# Patient Record
Sex: FEMALE | Race: OTHER | HISPANIC OR LATINO | URBAN - METROPOLITAN AREA
[De-identification: names, ages, dates, MRNs, and addresses within clinical notes are randomized per-mention and may not be internally consistent; named-entity substitution may affect disease eponyms.]

---

## 2017-08-21 ENCOUNTER — INPATIENT (INPATIENT)
Facility: HOSPITAL | Age: 47
LOS: 3 days | Discharge: HOME | DRG: 224 | End: 2017-08-25
Attending: HOSPITALIST | Admitting: HOSPITALIST
Payer: MEDICAID

## 2017-08-21 VITALS
RESPIRATION RATE: 20 BRPM | HEART RATE: 66 BPM | WEIGHT: 164.02 LBS | TEMPERATURE: 98 F | SYSTOLIC BLOOD PRESSURE: 117 MMHG | DIASTOLIC BLOOD PRESSURE: 78 MMHG | OXYGEN SATURATION: 98 % | HEIGHT: 64 IN

## 2017-08-21 DIAGNOSIS — Z90.13 ACQUIRED ABSENCE OF BILATERAL BREASTS AND NIPPLES: Chronic | ICD-10-CM

## 2017-08-21 DIAGNOSIS — R00.1 BRADYCARDIA, UNSPECIFIED: ICD-10-CM

## 2017-08-21 LAB
ALBUMIN SERPL ELPH-MCNC: 4.1 G/DL — SIGNIFICANT CHANGE UP (ref 3.3–5.2)
ALP SERPL-CCNC: 112 U/L — SIGNIFICANT CHANGE UP (ref 40–120)
ALT FLD-CCNC: 17 U/L — SIGNIFICANT CHANGE UP
ANION GAP SERPL CALC-SCNC: 13 MMOL/L — SIGNIFICANT CHANGE UP (ref 5–17)
APPEARANCE UR: CLEAR — SIGNIFICANT CHANGE UP
AST SERPL-CCNC: 24 U/L — SIGNIFICANT CHANGE UP
BACTERIA # UR AUTO: ABNORMAL
BILIRUB SERPL-MCNC: 0.6 MG/DL — SIGNIFICANT CHANGE UP (ref 0.4–2)
BILIRUB UR-MCNC: NEGATIVE — SIGNIFICANT CHANGE UP
BUN SERPL-MCNC: 16 MG/DL — SIGNIFICANT CHANGE UP (ref 8–20)
CALCIUM SERPL-MCNC: 9.2 MG/DL — SIGNIFICANT CHANGE UP (ref 8.6–10.2)
CHLORIDE SERPL-SCNC: 100 MMOL/L — SIGNIFICANT CHANGE UP (ref 98–107)
CO2 SERPL-SCNC: 24 MMOL/L — SIGNIFICANT CHANGE UP (ref 22–29)
COLOR SPEC: YELLOW — SIGNIFICANT CHANGE UP
COMMENT - URINE: SIGNIFICANT CHANGE UP
CREAT SERPL-MCNC: 0.64 MG/DL — SIGNIFICANT CHANGE UP (ref 0.5–1.3)
DIFF PNL FLD: ABNORMAL
EOSINOPHIL # BLD AUTO: 0.2 K/UL — SIGNIFICANT CHANGE UP (ref 0–0.5)
EOSINOPHIL NFR BLD AUTO: 5.8 % — SIGNIFICANT CHANGE UP (ref 0–6)
EPI CELLS # UR: SIGNIFICANT CHANGE UP
GLUCOSE SERPL-MCNC: 87 MG/DL — SIGNIFICANT CHANGE UP (ref 70–115)
GLUCOSE UR QL: NEGATIVE MG/DL — SIGNIFICANT CHANGE UP
HCT VFR BLD CALC: 40.2 % — SIGNIFICANT CHANGE UP (ref 37–47)
HGB BLD-MCNC: 13.1 G/DL — SIGNIFICANT CHANGE UP (ref 12–16)
KETONES UR-MCNC: NEGATIVE — SIGNIFICANT CHANGE UP
LEUKOCYTE ESTERASE UR-ACNC: ABNORMAL
LYMPHOCYTES # BLD AUTO: 1.6 K/UL — SIGNIFICANT CHANGE UP (ref 1–4.8)
LYMPHOCYTES # BLD AUTO: 54.2 % — SIGNIFICANT CHANGE UP (ref 20–55)
MCHC RBC-ENTMCNC: 29.6 PG — SIGNIFICANT CHANGE UP (ref 27–31)
MCHC RBC-ENTMCNC: 32.6 G/DL — SIGNIFICANT CHANGE UP (ref 32–36)
MCV RBC AUTO: 90.7 FL — SIGNIFICANT CHANGE UP (ref 81–99)
MONOCYTES # BLD AUTO: 0.3 K/UL — SIGNIFICANT CHANGE UP (ref 0–0.8)
MONOCYTES NFR BLD AUTO: 9.5 % — SIGNIFICANT CHANGE UP (ref 3–10)
NEUTROPHILS # BLD AUTO: 0.9 K/UL — LOW (ref 1.8–8)
NEUTROPHILS NFR BLD AUTO: 30.5 % — LOW (ref 37–73)
NITRITE UR-MCNC: NEGATIVE — SIGNIFICANT CHANGE UP
NT-PROBNP SERPL-SCNC: 429 PG/ML — HIGH (ref 0–300)
PH UR: 5 — SIGNIFICANT CHANGE UP (ref 5–8)
PLATELET # BLD AUTO: 217 K/UL — SIGNIFICANT CHANGE UP (ref 150–400)
POTASSIUM SERPL-MCNC: 4.6 MMOL/L — SIGNIFICANT CHANGE UP (ref 3.5–5.3)
POTASSIUM SERPL-SCNC: 4.6 MMOL/L — SIGNIFICANT CHANGE UP (ref 3.5–5.3)
PROT SERPL-MCNC: 7.8 G/DL — SIGNIFICANT CHANGE UP (ref 6.6–8.7)
PROT UR-MCNC: NEGATIVE MG/DL — SIGNIFICANT CHANGE UP
RBC # BLD: 4.43 M/UL — SIGNIFICANT CHANGE UP (ref 4.4–5.2)
RBC # FLD: 11.8 % — SIGNIFICANT CHANGE UP (ref 11–15.6)
RBC CASTS # UR COMP ASSIST: SIGNIFICANT CHANGE UP /HPF (ref 0–4)
SODIUM SERPL-SCNC: 137 MMOL/L — SIGNIFICANT CHANGE UP (ref 135–145)
SP GR SPEC: 1.01 — SIGNIFICANT CHANGE UP (ref 1.01–1.02)
TROPONIN T SERPL-MCNC: <0.01 NG/ML — SIGNIFICANT CHANGE UP (ref 0–0.06)
UROBILINOGEN FLD QL: NEGATIVE MG/DL — SIGNIFICANT CHANGE UP
WBC # BLD: 3 K/UL — LOW (ref 4.8–10.8)
WBC # FLD AUTO: 3 K/UL — LOW (ref 4.8–10.8)
WBC UR QL: SIGNIFICANT CHANGE UP

## 2017-08-21 PROCEDURE — 99285 EMERGENCY DEPT VISIT HI MDM: CPT | Mod: 25

## 2017-08-21 PROCEDURE — 93010 ELECTROCARDIOGRAM REPORT: CPT

## 2017-08-21 PROCEDURE — 71010: CPT | Mod: 26

## 2017-08-21 PROCEDURE — 99223 1ST HOSP IP/OBS HIGH 75: CPT

## 2017-08-21 RX ORDER — ISOSORBIDE MONONITRATE 60 MG/1
30 TABLET, EXTENDED RELEASE ORAL DAILY
Qty: 0 | Refills: 0 | Status: DISCONTINUED | OUTPATIENT
Start: 2017-08-22 | End: 2017-08-24

## 2017-08-21 RX ORDER — SPIRONOLACTONE 25 MG/1
25 TABLET, FILM COATED ORAL DAILY
Qty: 0 | Refills: 0 | Status: DISCONTINUED | OUTPATIENT
Start: 2017-08-22 | End: 2017-08-25

## 2017-08-21 RX ORDER — LOSARTAN POTASSIUM 100 MG/1
25 TABLET, FILM COATED ORAL DAILY
Qty: 0 | Refills: 0 | Status: DISCONTINUED | OUTPATIENT
Start: 2017-08-22 | End: 2017-08-25

## 2017-08-21 RX ORDER — LOSARTAN POTASSIUM 100 MG/1
1 TABLET, FILM COATED ORAL
Qty: 0 | Refills: 0 | COMMUNITY

## 2017-08-21 RX ORDER — AMLODIPINE BESYLATE 2.5 MG/1
20 TABLET ORAL DAILY
Qty: 0 | Refills: 0 | Status: DISCONTINUED | OUTPATIENT
Start: 2017-08-22 | End: 2017-08-24

## 2017-08-21 RX ORDER — METOPROLOL TARTRATE 50 MG
100 TABLET ORAL DAILY
Qty: 0 | Refills: 0 | Status: DISCONTINUED | OUTPATIENT
Start: 2017-08-22 | End: 2017-08-24

## 2017-08-21 RX ORDER — MICONAZOLE NITRATE 2 %
1 CREAM (GRAM) TOPICAL
Qty: 0 | Refills: 0 | Status: DISCONTINUED | OUTPATIENT
Start: 2017-08-21 | End: 2017-08-25

## 2017-08-21 RX ORDER — ENOXAPARIN SODIUM 100 MG/ML
40 INJECTION SUBCUTANEOUS EVERY 24 HOURS
Qty: 0 | Refills: 0 | Status: DISCONTINUED | OUTPATIENT
Start: 2017-08-21 | End: 2017-08-23

## 2017-08-21 RX ORDER — SPIRONOLACTONE 25 MG/1
1 TABLET, FILM COATED ORAL
Qty: 0 | Refills: 0 | COMMUNITY

## 2017-08-21 RX ADMIN — ENOXAPARIN SODIUM 40 MILLIGRAM(S): 100 INJECTION SUBCUTANEOUS at 21:42

## 2017-08-21 NOTE — ED ADULT NURSE NOTE - OBJECTIVE STATEMENT
53 yof [place] speaking presents to ED with complaint of chest tightness 8/10 with palpitations since 6 PM last night. pt states this is a reoccurring problem 53 yof [place] speaking presents to ED with complaint of chest tightness 8/10 with palpitations since 6 PM last night. pt states this is a reoccurring problem. pt states she becomes sob on exertion with  chest tightness. lung sounds clear equal bilaterally, normal s1 s2 heard on auscultation. 53 yof [place] speaking presents to ED with complaint of chest tightness 8/10 with palpitations since 6 PM last night. pt states this is a reoccurring problem. pt states she becomes sob on exertion with  chest tightness. lung sounds clear equal bilaterally, normal s1 s2 heard on auscultation. skin pink warm and dry. sinus bradycardia on monitor, moves all extremities.

## 2017-08-21 NOTE — ED ADULT NURSE REASSESSMENT NOTE - NS ED NURSE REASSESS COMMENT FT1
patient aox4 comfortable in appearance. respirations clear equal and unlabored. heart sounds s1 s2  wdl bradycardia, abdomen soft nontender + bowel sounds all 4 quadrants, moves all extremities. + pulse all 4 extremities. + sensation all 4 extremities. patient and family updated on plan of care. patient and family educated on hourly rounding procedures and understands call bell system.     pt understands NPO at this time. ivf provided for nutrition

## 2017-08-21 NOTE — ED PROVIDER NOTE - CHPI ED SYMPTOMS NEG
no back pain/no cough/no fever/no dizziness/no vomiting/no chills/no diaphoresis/no syncope/no nausea

## 2017-08-21 NOTE — CONSULT NOTE ADULT - ASSESSMENT
47yr old female with h/o NICM  presents with palpitations and generalized fatigue and sob with exertion x 2 weeks.  patient previously seen at [facility] here history is noted an initial presentation of PNA  5/19//2017 at [facility] at that time LVEF 20- 15% low normal Rv systolic function trace MR, cxray revealed a  had pleural effusion s/p  thoracentesis the patient was placed on load reduction therapy  and a Cardiac CT 6/21/2017 revealed nonobstructive CAD. The patient underwent a repeat 2-D echocardiogram on 7/14/2017  which revealed persistent/worsening LV systolic dysfunction LVEF < 15%.  patient presents to day with complaints of worsening JOHNSON x 2 weeks . She denies chest pain syncope pre-syncope orthopnea or LE edema.    A/p  Chronic NICM , NYHA class II HFrEF, (-) cardiac CTA, HTN, QRs 98ms, Aute on chronic HF exacerbation:    HFrEF: Continue load reduction therapy , add lasix 20 mg IV Q12 follow I/os, increase lasix as indicated , daily weights, strict I/os; check TSH  HfrEF: repeat TTEif LVEF <35% will likely benefit from ICD fro primary prevention, once clinically improved  HTN: BP stable near goal  Further reccs to follow

## 2017-08-21 NOTE — H&P ADULT - ASSESSMENT
47yr old female with recently diagnosed cardiomyopathy with EF 15% in june 2017 presents with c/o feeling sob on exertion, pedal swelling intermittently, palpitations and chest tightness. These symptoms have been intermittent, haven gotten worse over last week.   She was admitted in 03/2017 with Sob - treated for pneumonia, had pleural effusion, which was drained. Found to have cardiomyopathy with EF 30%, this was determined to be non-ischemic CMP. Her recent EF in june dropped to 15% inspite of being of CHF meds. She is now being admitted for further care.     1. NICM - telemetry  ECHO  cardio/EP recs  ?ICD palcement  ct CHF meds  check TSH, A1C    2. DVT px     3.Tinea on chest - miconazole ointment

## 2017-08-21 NOTE — ED STATDOCS - PROGRESS NOTE DETAILS
53 year old female, with hx of PNA, "enlarged heart", and "fluid in her heart", presenting to the ED complaining of chest pain x 3 days and exertional SOB x more than 1 week. Pt states her pain is intermittent and feels like "pressure". She states that her pain onset with walking. Pt states she also has intermittent leg swelling. Upon examination in the intake room, pt was found to be non-toxic appearing and in no acute distress. Pt has clears lungs with no acute respiratory distress. Pt also has diminished but regular heart sounds. Pt to be moved into the main ED for further evaluation by another provider.   : Jie  Cardiologist: Dr. Abraham Fernández

## 2017-08-21 NOTE — H&P ADULT - NSHPPHYSICALEXAM_GEN_ALL_CORE
PHYSICAL EXAM:    GENERAL: NAD, well-groomed, well-developed  HEAD:  Atraumatic, Normocephalic  EYES: EOMI, PERRLA, conjunctiva and sclera clear  ENMT: Moist mucous membranes, Good dentition, No lesions  NECK: Supple, No JVD  NERVOUS SYSTEM:  Alert & Oriented X3, Good concentration  CHEST/LUNG: Clear to percussion bilaterally; No rales, rhonchi, wheezing, or rubs  HEART: Regular rate and rhythm; No murmurs, rubs, or gallops  ABDOMEN: Soft, Nontender, Nondistended; Bowel sounds present  EXTREMITIES:   No clubbing, cyanosis, or edema  SKIN: tinea rash on lt side of chest.

## 2017-08-21 NOTE — CONSULT NOTE ADULT - SUBJECTIVE AND OBJECTIVE BOX
Patient is a 47y old  Female who presents with a chief complaint of SOB, palpitations, chest pain (21 Aug 2017 11:12)      HPI:  47yr old female with h/o NICM  presents with palpitations and generalized fatigue and sob with exertion x 2 weeks.  patient previously seen at [facility] here history is noted an initial presentation of PNA  2017 at [facility] at that time LVEF 20- 15% low normal Rv systolic function trace MR, cxray revealed a  had pleural effusion s/p  thoracentesis the patient was placed on load reduction therapy  and a Cardiac CT 2017 revealed nonobstructive CAD. The patient underwent a repeat 2-D echocardiogram on 2017  which revealed persistent/worsening LV systolic dysfunction LVEF < 15%.        PAST MEDICAL & SURGICAL HISTORY:  Essential hypertension  Bradycardia  H/O bilateral breast reduction surgery      PREVIOUS DIAGNOSTIC TESTING:      ECHO  FINDINGS:    STRESS  FINDINGS:    CATHETERIZATION  FINDINGS:      Allergies    No Known Allergies    Intolerances        MEDICATIONS  (STANDING):  enoxaparin Injectable 40 milliGRAM(s) SubCutaneous every 24 hours  miconazole 2% Cream 1 Application(s) Topical two times a day    MEDICATIONS  (PRN):      FAMILY HISTORY:  No pertinent family history in first degree relatives      SOCIAL HISTORY:    CIGARETTES:    ALCOHOL:    REVIEW OF SYSTEMS:  CONSTITUTIONAL: No fever, weight loss, or fatigue  EYES: No eye pain, visual disturbances, or discharge  ENMT:  No difficulty hearing, tinnitus, vertigo; No sinus or throat pain  NECK: No pain or stiffness  RESPIRATORY: No cough, wheezing, chills or hemoptysis; No Shortness of Breath  CARDIOVASCULAR: No chest pain, palpitations, passing out, dizziness, or leg swelling  GASTROINTESTINAL: No abdominal or epigastric pain. No nausea, vomiting, or hematemesis; No diarrhea or constipation. No melena or hematochezia.  GENITOURINARY: No dysuria, frequency, hematuria, or incontinence  NEUROLOGICAL: No headaches, memory loss, loss of strength, numbness, or tremors  SKIN: No itching, burning, rashes, or lesions   LYMPH Nodes: No enlarged glands  ENDOCRINE: No heat or cold intolerance; No hair loss  MUSCULOSKELETAL: No joint pain or swelling; No muscle, back, or extremity pain  PSYCHIATRIC: No depression, anxiety, mood swings, or difficulty sleeping  HEME/LYMPH: No easy bruising, or bleeding gums  ALLERY AND IMMUNOLOGIC: No hives or eczema	    Vital Signs Last 24 Hrs  T(C): 36.6 (21 Aug 2017 11:39), Max: 36.7 (21 Aug 2017 07:06)  T(F): 97.8 (21 Aug 2017 11:39), Max: 98 (21 Aug 2017 07:06)  HR: 52 (21 Aug 2017 11:39) (52 - 66)  BP: 108/67 (21 Aug 2017 11:39) (108/67 - 117/78)  BP(mean): --  RR: 18 (21 Aug 2017 11:39) (18 - 20)  SpO2: 98% (21 Aug 2017 11:39) (98% - 98%)    Daily Height in cm: 162.56 (21 Aug 2017 07:06)    Daily     I&O's Detail      PHYSICAL EXAM:  Appearance: Normal, well nourished	  HEENT:   Normal oral mucosa, PERRL, EOMI, sclera non-icteric	  Lymphatic: No cervical lymphadenopathy  Cardiovascular: Normal S1 S2, No JVD, No cardiac murmurs, No carotid bruits, No peripheral edema  Respiratory: Lungs clear to auscultation	  Psychiatry: A & O x 3, Mood & affect appropriate  Gastrointestinal:  Soft, Non-tender, + BS, no bruits	  Skin: No rashes, No ecchymoses, No cyanosis  Neurologic: Grossly non-focal with full strength in all four extremities  Extremities: Normal range of motion, No clubbing, cyanosis or edema  Vascular: Peripheral pulses palpable 2+ bilaterally      INTERPRETATION OF TELEMETRY:    ECG:    LABS:                        13.1   3.0   )-----------( 217      ( 21 Aug 2017 08:22 )             40.2     08-21    137  |  100  |  16.0  ----------------------------<  87  4.6   |  24.0  |  0.64    Ca    9.2      21 Aug 2017 08:22    TPro  7.8  /  Alb  4.1  /  TBili  0.6  /  DBili  x   /  AST  24  /  ALT  17  /  AlkPhos  112  08-21    CARDIAC MARKERS ( 21 Aug 2017 08:22 )  x     / <0.01 ng/mL / x     / x     / x            Urinalysis Basic - ( 21 Aug 2017 11:40 )    Color: Yellow / Appearance: Clear / S.015 / pH: x  Gluc: x / Ketone: Negative  / Bili: Negative / Urobili: Negative mg/dL   Blood: x / Protein: Negative mg/dL / Nitrite: Negative   Leuk Esterase: Small / RBC: 0-2 /HPF / WBC 3-5   Sq Epi: x / Non Sq Epi: x / Bacteria: x      I&O's Summary    BNPSerum Pro-Brain Natriuretic Peptide: 429 pg/mL ( @ 08:22)    RADIOLOGY & ADDITIONAL STUDIES: Patient is a 47y old  Female who presents with a chief complaint of SOB, palpitations, chest pain (21 Aug 2017 11:12)      HPI:  47yr old female with h/o NICM  presents with palpitations and generalized fatigue and sob with exertion x 2 weeks.  patient previously seen at [facility] here history is noted an initial presentation of PNA  2017 at [facility] at that time LVEF 20- 15% low normal Rv systolic function trace MR, cxray revealed a  had pleural effusion s/p  thoracentesis the patient was placed on load reduction therapy  and a Cardiac CT 2017 revealed nonobstructive CAD. The patient underwent a repeat 2-D echocardiogram on 2017  which revealed persistent/worsening LV systolic dysfunction LVEF < 15%.  patient presents to day with complaints of worsening JOHNSON x 2 weeks . Shge denies chest pain syncope pre-syncope orthopnea or LE edema.        PAST MEDICAL & SURGICAL HISTORY:  Essential hypertension  Bradycardia  H/O bilateral breast reduction surgery      PREVIOUS DIAGNOSTIC TESTING:      ECHO  FINDINGS: [facility] pending  NUMC: 2017" LVEF < 15% low normal RV systolic function , trace MR   NUMC: 2017: LVEF 20-15%  Cardiac CTA: 2017 : Nonobstructive CAD        Allergies    No Known Allergies    Intolerances        MEDICATIONS  (STANDING):  enoxaparin Injectable 40 milliGRAM(s) SubCutaneous every 24 hours  miconazole 2% Cream 1 Application(s) Topical two times a day    MEDICATIONS  (PRN):      FAMILY HISTORY:  No pertinent family history in first degree relatives      SOCIAL HISTORY: Lives with family     CIGARETTES:None    ALCOHOL:None    REVIEW OF SYSTEMS:  CONSTITUTIONAL: No fever, weight loss, or fatigue  EYES: No eye pain, visual disturbances, or discharge  ENMT:  No difficulty hearing, tinnitus, vertigo; No sinus or throat pain  NECK: No pain or stiffness  RESPIRATORY: No cough, wheezing, chills or hemoptysis; No Shortness of Breath  CARDIOVASCULAR: No chest pain, palpitations, passing out, dizziness, or leg swelling  GASTROINTESTINAL: No abdominal or epigastric pain. No nausea, vomiting, or hematemesis; No diarrhea or constipation. No melena or hematochezia.  GENITOURINARY: No dysuria, frequency, hematuria, or incontinence  NEUROLOGICAL: No headaches, memory loss, loss of strength, numbness, or tremors  SKIN: No itching, burning, rashes, or lesions   LYMPH Nodes: No enlarged glands  ENDOCRINE: No heat or cold intolerance; No hair loss  MUSCULOSKELETAL: No joint pain or swelling; No muscle, back, or extremity pain  PSYCHIATRIC: No depression, anxiety, mood swings, or difficulty sleeping  HEME/LYMPH: No easy bruising, or bleeding gums  ALLERY AND IMMUNOLOGIC: No hives or eczema	    Vital Signs Last 24 Hrs  T(C): 36.6 (21 Aug 2017 11:39), Max: 36.7 (21 Aug 2017 07:06)  T(F): 97.8 (21 Aug 2017 11:39), Max: 98 (21 Aug 2017 07:06)  HR: 52 (21 Aug 2017 11:39) (52 - 66)  BP: 108/67 (21 Aug 2017 11:39) (108/67 - 117/78)  BP(mean): --  RR: 18 (21 Aug 2017 11:39) (18 - 20)  SpO2: 98% (21 Aug 2017 11:39) (98% - 98%)    Daily Height in cm: 162.56 (21 Aug 2017 07:06)    Daily     I&O's Detail      PHYSICAL EXAM:  Appearance: Normal, well nourished	  HEENT:   Normal oral mucosa, PERRL, EOMI, sclera non-icteric	  Lymphatic: No cervical lymphadenopathy  Cardiovascular: Normal S1 S2, No JVD, No cardiac murmurs, No carotid bruits, No peripheral edema  Respiratory: Lungs clear to auscultation	  Psychiatry: A & O x 3, Mood & affect appropriate  Gastrointestinal:  Soft, Non-tender, + BS, no bruits	  Skin: No rashes, No ecchymoses, No cyanosis  Neurologic: Grossly non-focal with full strength in all four extremities  Extremities: Normal range of motion, No clubbing, cyanosis or edema  Vascular: Peripheral pulses palpable 2+ bilaterally      INTERPRETATION OF TELEMETRY:    ECG:    LABS:                        13.1   3.0   )-----------( 217      ( 21 Aug 2017 08:22 )             40.2     08-21    137  |  100  |  16.0  ----------------------------<  87  4.6   |  24.0  |  0.64    Ca    9.2      21 Aug 2017 08:22    TPro  7.8  /  Alb  4.1  /  TBili  0.6  /  DBili  x   /  AST  24  /  ALT  17  /  AlkPhos  112  08-21    CARDIAC MARKERS ( 21 Aug 2017 08:22 )  x     / <0.01 ng/mL / x     / x     / x            Urinalysis Basic - ( 21 Aug 2017 11:40 )    Color: Yellow / Appearance: Clear / S.015 / pH: x  Gluc: x / Ketone: Negative  / Bili: Negative / Urobili: Negative mg/dL   Blood: x / Protein: Negative mg/dL / Nitrite: Negative   Leuk Esterase: Small / RBC: 0-2 /HPF / WBC 3-5   Sq Epi: x / Non Sq Epi: x / Bacteria: x      I&O's Summary    BNPSerum Pro-Brain Natriuretic Peptide: 429 pg/mL ( @ 08:22)    RADIOLOGY & ADDITIONAL STUDIES:

## 2017-08-21 NOTE — H&P ADULT - HISTORY OF PRESENT ILLNESS
47yr old fany with recently diagnosed cardiomyopathy with EF 15% in june 2017 presents with c/o feeling sob on exertion, pedal swelling intermittently, palpitations and chest tightness. These symptoms have been intermittent, haven gotten worse over last week.   She was admitted in 03/2017 with Sob - treated for pneumonia, had pleural effusion, which was drained. Found to have cardiomyopahty with EF 30%, this was determined to be non-schemic CMP. Her reent EF in june dropped to 15% inspite of being of CHF meds. She is now being admitted for further care.

## 2017-08-22 LAB
ANION GAP SERPL CALC-SCNC: 13 MMOL/L — SIGNIFICANT CHANGE UP (ref 5–17)
BUN SERPL-MCNC: 22 MG/DL — HIGH (ref 8–20)
CALCIUM SERPL-MCNC: 8.9 MG/DL — SIGNIFICANT CHANGE UP (ref 8.6–10.2)
CHLORIDE SERPL-SCNC: 101 MMOL/L — SIGNIFICANT CHANGE UP (ref 98–107)
CO2 SERPL-SCNC: 26 MMOL/L — SIGNIFICANT CHANGE UP (ref 22–29)
CREAT SERPL-MCNC: 0.8 MG/DL — SIGNIFICANT CHANGE UP (ref 0.5–1.3)
GLUCOSE SERPL-MCNC: 97 MG/DL — SIGNIFICANT CHANGE UP (ref 70–115)
HBA1C BLD-MCNC: 5.2 % — SIGNIFICANT CHANGE UP (ref 4–5.6)
HCT VFR BLD CALC: 39.8 % — SIGNIFICANT CHANGE UP (ref 37–47)
HGB BLD-MCNC: 13 G/DL — SIGNIFICANT CHANGE UP (ref 12–16)
MAGNESIUM SERPL-MCNC: 2.2 MG/DL — SIGNIFICANT CHANGE UP (ref 1.6–2.6)
MCHC RBC-ENTMCNC: 29.6 PG — SIGNIFICANT CHANGE UP (ref 27–31)
MCHC RBC-ENTMCNC: 32.7 G/DL — SIGNIFICANT CHANGE UP (ref 32–36)
MCV RBC AUTO: 90.7 FL — SIGNIFICANT CHANGE UP (ref 81–99)
PLATELET # BLD AUTO: 226 K/UL — SIGNIFICANT CHANGE UP (ref 150–400)
POTASSIUM SERPL-MCNC: 4.1 MMOL/L — SIGNIFICANT CHANGE UP (ref 3.5–5.3)
POTASSIUM SERPL-SCNC: 4.1 MMOL/L — SIGNIFICANT CHANGE UP (ref 3.5–5.3)
RBC # BLD: 4.39 M/UL — LOW (ref 4.4–5.2)
RBC # FLD: 11.8 % — SIGNIFICANT CHANGE UP (ref 11–15.6)
SODIUM SERPL-SCNC: 140 MMOL/L — SIGNIFICANT CHANGE UP (ref 135–145)
TSH SERPL-MCNC: 0.36 UIU/ML — SIGNIFICANT CHANGE UP (ref 0.27–4.2)
WBC # BLD: 3.2 K/UL — LOW (ref 4.8–10.8)
WBC # FLD AUTO: 3.2 K/UL — LOW (ref 4.8–10.8)

## 2017-08-22 PROCEDURE — 99232 SBSQ HOSP IP/OBS MODERATE 35: CPT

## 2017-08-22 RX ORDER — ACETAMINOPHEN 500 MG
650 TABLET ORAL EVERY 6 HOURS
Qty: 0 | Refills: 0 | Status: DISCONTINUED | OUTPATIENT
Start: 2017-08-22 | End: 2017-08-25

## 2017-08-22 RX ADMIN — ISOSORBIDE MONONITRATE 30 MILLIGRAM(S): 60 TABLET, EXTENDED RELEASE ORAL at 12:04

## 2017-08-22 RX ADMIN — Medication 1 APPLICATION(S): at 09:43

## 2017-08-22 RX ADMIN — LOSARTAN POTASSIUM 25 MILLIGRAM(S): 100 TABLET, FILM COATED ORAL at 09:42

## 2017-08-22 RX ADMIN — Medication 100 MILLIGRAM(S): at 15:23

## 2017-08-22 RX ADMIN — Medication 1 APPLICATION(S): at 18:40

## 2017-08-22 RX ADMIN — AMLODIPINE BESYLATE 20 MILLIGRAM(S): 2.5 TABLET ORAL at 09:42

## 2017-08-22 RX ADMIN — SPIRONOLACTONE 25 MILLIGRAM(S): 25 TABLET, FILM COATED ORAL at 09:42

## 2017-08-22 RX ADMIN — ENOXAPARIN SODIUM 40 MILLIGRAM(S): 100 INJECTION SUBCUTANEOUS at 21:41

## 2017-08-22 NOTE — PROGRESS NOTE ADULT - SUBJECTIVE AND OBJECTIVE BOX
AAKASH MAHONEY    [id number]    47y      Female    CC: admitted with symptomatic chronic systolic HF 2/2 NICM with  SOB & palpitations   c/o headache   SOB and palpitations are ok since she has been in bed    INTERVAL HPI/OVERNIGHT EVENTS: no acute events    REVIEW OF SYSTEMS:    CONSTITUTIONAL: No fever  RESPIRATORY: No cough No shortness of breath  CARDIOVASCULAR: No chest pain, palpitations      Vital Signs Last 24 Hrs  T(C): 36.7 (22 Aug 2017 09:40), Max: 36.9 (21 Aug 2017 17:42)  T(F): 98 (22 Aug 2017 09:40), Max: 98.5 (21 Aug 2017 17:42)  HR: 55 (22 Aug 2017 09:40) (50 - 61)  BP: 140/74 (22 Aug 2017 09:40) (92/60 - 140/74)  BP(mean): --  RR: 16 (22 Aug 2017 09:40) (15 - 18)  SpO2: 94% (22 Aug 2017 09:40) (94% - 99%)    PHYSICAL EXAM:    GENERAL: NAD, well-groomed  HEENT: PERRL, +EOMI  NECK: soft, Supple  CHEST/LUNG: Clear to percussion bilaterally; No wheezing  HEART: S1S2+, Regular rate and rhythm; No murmurs, rubs, or gallops  EXTREMITIES: No clubbing, cyanosis, or edema  SKIN: tinea on Lt chest  NEURO: AAOX3      08- @ 07:01  -   @ 07:00  --------------------------------------------------------  IN: 120 mL / OUT: 150 mL / NET: -30 mL        LABS:                        13.0   3.2   )-----------( 226      ( 22 Aug 2017 05:20 )             39.8     08    140  |  101  |  22.0<H>  ----------------------------<  97  4.1   |  26.0  |  0.80    Ca    8.9      22 Aug 2017 05:20  Mg     2.2         TPro  7.8  /  Alb  4.1  /  TBili  0.6  /  DBili  x   /  AST  24  /  ALT  17  /  AlkPhos  112  08-21      Urinalysis Basic - ( 21 Aug 2017 11:40 )    Color: Yellow / Appearance: Clear / S.015 / pH: x  Gluc: x / Ketone: Negative  / Bili: Negative / Urobili: Negative mg/dL   Blood: x / Protein: Negative mg/dL / Nitrite: Negative   Leuk Esterase: Small / RBC: 0-2 /HPF / WBC 3-5   Sq Epi: x / Non Sq Epi: x / Bacteria: x          MEDICATIONS  : reviewed       RADIOLOGY & ADDITIONAL TESTS:

## 2017-08-23 DIAGNOSIS — Z29.9 ENCOUNTER FOR PROPHYLACTIC MEASURES, UNSPECIFIED: ICD-10-CM

## 2017-08-23 DIAGNOSIS — I42.9 CARDIOMYOPATHY, UNSPECIFIED: ICD-10-CM

## 2017-08-23 DIAGNOSIS — B35.9 DERMATOPHYTOSIS, UNSPECIFIED: ICD-10-CM

## 2017-08-23 DIAGNOSIS — I10 ESSENTIAL (PRIMARY) HYPERTENSION: ICD-10-CM

## 2017-08-23 LAB
ANION GAP SERPL CALC-SCNC: 14 MMOL/L — SIGNIFICANT CHANGE UP (ref 5–17)
BLD GP AB SCN SERPL QL: SIGNIFICANT CHANGE UP
BUN SERPL-MCNC: 22 MG/DL — HIGH (ref 8–20)
CALCIUM SERPL-MCNC: 9.1 MG/DL — SIGNIFICANT CHANGE UP (ref 8.6–10.2)
CHLORIDE SERPL-SCNC: 100 MMOL/L — SIGNIFICANT CHANGE UP (ref 98–107)
CO2 SERPL-SCNC: 23 MMOL/L — SIGNIFICANT CHANGE UP (ref 22–29)
CREAT SERPL-MCNC: 0.8 MG/DL — SIGNIFICANT CHANGE UP (ref 0.5–1.3)
GLUCOSE SERPL-MCNC: 97 MG/DL — SIGNIFICANT CHANGE UP (ref 70–115)
HCT VFR BLD CALC: 40 % — SIGNIFICANT CHANGE UP (ref 37–47)
HGB BLD-MCNC: 13.5 G/DL — SIGNIFICANT CHANGE UP (ref 12–16)
MAGNESIUM SERPL-MCNC: 2.2 MG/DL — SIGNIFICANT CHANGE UP (ref 1.6–2.6)
MCHC RBC-ENTMCNC: 30 PG — SIGNIFICANT CHANGE UP (ref 27–31)
MCHC RBC-ENTMCNC: 33.8 G/DL — SIGNIFICANT CHANGE UP (ref 32–36)
MCV RBC AUTO: 88.9 FL — SIGNIFICANT CHANGE UP (ref 81–99)
PLATELET # BLD AUTO: 220 K/UL — SIGNIFICANT CHANGE UP (ref 150–400)
POTASSIUM SERPL-MCNC: 4.4 MMOL/L — SIGNIFICANT CHANGE UP (ref 3.5–5.3)
POTASSIUM SERPL-SCNC: 4.4 MMOL/L — SIGNIFICANT CHANGE UP (ref 3.5–5.3)
RBC # BLD: 4.5 M/UL — SIGNIFICANT CHANGE UP (ref 4.4–5.2)
RBC # FLD: 11.9 % — SIGNIFICANT CHANGE UP (ref 11–15.6)
SODIUM SERPL-SCNC: 137 MMOL/L — SIGNIFICANT CHANGE UP (ref 135–145)
TYPE + AB SCN PNL BLD: SIGNIFICANT CHANGE UP
WBC # BLD: 2.9 K/UL — LOW (ref 4.8–10.8)
WBC # FLD AUTO: 2.9 K/UL — LOW (ref 4.8–10.8)

## 2017-08-23 PROCEDURE — 99233 SBSQ HOSP IP/OBS HIGH 50: CPT

## 2017-08-23 PROCEDURE — 93306 TTE W/DOPPLER COMPLETE: CPT | Mod: 26

## 2017-08-23 RX ORDER — FUROSEMIDE 40 MG
20 TABLET ORAL EVERY 12 HOURS
Qty: 0 | Refills: 0 | Status: DISCONTINUED | OUTPATIENT
Start: 2017-08-23 | End: 2017-08-24

## 2017-08-23 RX ADMIN — ISOSORBIDE MONONITRATE 30 MILLIGRAM(S): 60 TABLET, EXTENDED RELEASE ORAL at 12:12

## 2017-08-23 RX ADMIN — ENOXAPARIN SODIUM 40 MILLIGRAM(S): 100 INJECTION SUBCUTANEOUS at 21:15

## 2017-08-23 RX ADMIN — LOSARTAN POTASSIUM 25 MILLIGRAM(S): 100 TABLET, FILM COATED ORAL at 10:43

## 2017-08-23 RX ADMIN — Medication 20 MILLIGRAM(S): at 09:10

## 2017-08-23 RX ADMIN — Medication 100 MILLIGRAM(S): at 09:10

## 2017-08-23 RX ADMIN — AMLODIPINE BESYLATE 20 MILLIGRAM(S): 2.5 TABLET ORAL at 10:43

## 2017-08-23 RX ADMIN — SPIRONOLACTONE 25 MILLIGRAM(S): 25 TABLET, FILM COATED ORAL at 09:10

## 2017-08-23 RX ADMIN — Medication 1 APPLICATION(S): at 17:21

## 2017-08-23 RX ADMIN — Medication 20 MILLIGRAM(S): at 17:18

## 2017-08-23 NOTE — PROGRESS NOTE ADULT - SUBJECTIVE AND OBJECTIVE BOX
Patient is a 47y old  Female who presents with a chief complaint of SOB, palpitations, chest pain (21 Aug 2017 11:12)        INTERVAL HPI/OVERNIGHT EVENTS:  Patient seen and examined at bedside. No acute events overnight. Patient states she has JOHNSON and fatigue which persists. Has no known pmhx and is from [place].  Patient denies chest pain, abd pain, N/V, fever, chills, dysuria or any other complaints. All remainder ROS negative.         Vital Signs Last 24 Hrs  T(C): 37 (23 Aug 2017 16:08), Max: 37 (23 Aug 2017 16:08)  T(F): 98.6 (23 Aug 2017 16:08), Max: 98.6 (23 Aug 2017 16:08)  HR: 78 (23 Aug 2017 16:08) (53 - 84)  BP: 101/66 (23 Aug 2017 16:08) (96/50 - 112/64)  BP(mean): --  RR: 16 (23 Aug 2017 16:08) (15 - 16)  SpO2: 98% (23 Aug 2017 12:10) (97% - 98%)      I&O's Summary    22 Aug 2017 07:01  -  23 Aug 2017 07:00  --------------------------------------------------------  IN: 240 mL / OUT: 2150 mL / NET: -1910 mL    23 Aug 2017 07:01  -  23 Aug 2017 16:58  --------------------------------------------------------  IN: 0 mL / OUT: 1450 mL / NET: -1450 mL        CONSTITUTIONAL: Well appearing, well nourished, awake, alert and in no apparent distress  CARDIAC: Normal rate, regular rhythm.  Heart sounds S1, S2.  No murmurs, rubs or gallops   RESPIRATORY: Decreased air entry, bibasilar crackles   GASTROENTEROLOGY: Soft, NT ND BS+ normoactive   EXTREMITIES: No edema, cyanosis or deformity   NEUROLOGICAL: Alert and oriented, no focal deficits, no motor or sensory deficits.  SKIN: No rash, skin turgor wnl     MEDICATIONS  (STANDING):  enoxaparin Injectable 40 milliGRAM(s) SubCutaneous every 24 hours  isosorbide   mononitrate ER Tablet (IMDUR) 30 milliGRAM(s) Oral daily  losartan 25 milliGRAM(s) Oral daily  metoprolol succinate  milliGRAM(s) Oral daily  spironolactone 25 milliGRAM(s) Oral daily  amLODIPine   Tablet 20 milliGRAM(s) Oral daily  miconazole 2% Cream 1 Application(s) Topical two times a day  furosemide   Injectable 20 milliGRAM(s) IV Push every 12 hours    MEDICATIONS  (PRN):  acetaminophen   Tablet. 650 milliGRAM(s) Oral every 6 hours PRN Mild Pain (1 - 3)      LABS:                        13.5   2.9   )-----------( 220      ( 23 Aug 2017 05:19 )             40.0     08-23    137  |  100  |  22.0<H>  ----------------------------<  97  4.4   |  23.0  |  0.80    Ca    9.1      23 Aug 2017 05:19  Mg     2.2     08-23        RADIOLOGY & ADDITIONAL TESTS:  Pending TTE

## 2017-08-23 NOTE — PROGRESS NOTE ADULT - PROBLEM SELECTOR PLAN 1
NICM HFrEF <15%   -pt with symptoms of JOHNSON/ fatigue   -Continue load reduction therapy  -added lasix 20 mg IV Q12   -c/w imdur 20mg po qd   -c/w losartan 25mg po qd   -c/w toprol xl 100mg po qd   -c/w aldactone 25mg po qd   -c/w daily weight/strict I/Os    -tsh wnl   -repeat TTE and if LVEF <35% will likely benefit from ICD for primary prevention as per EP   -EP f/u noted and appreciated

## 2017-08-23 NOTE — PROGRESS NOTE ADULT - SUBJECTIVE AND OBJECTIVE BOX
Events noted , stable on tele SB no sxs  Echo pending   MEDICATIONS  (STANDING):  enoxaparin Injectable 40 milliGRAM(s) SubCutaneous every 24 hours  isosorbide   mononitrate ER Tablet (IMDUR) 30 milliGRAM(s) Oral daily  losartan 25 milliGRAM(s) Oral daily  metoprolol succinate  milliGRAM(s) Oral daily  spironolactone 25 milliGRAM(s) Oral daily  amLODIPine   Tablet 20 milliGRAM(s) Oral daily  miconazole 2% Cream 1 Application(s) Topical two times a day  furosemide   Injectable 20 milliGRAM(s) IV Push every 12 hours    MEDICATIONS  (PRN):  acetaminophen   Tablet. 650 milliGRAM(s) Oral every 6 hours PRN Mild Pain (1 - 3)      Allergies    No Known Allergies    Intolerances      PAST MEDICAL & SURGICAL HISTORY:  Essential hypertension  Bradycardia  H/O bilateral breast reduction surgery      Vital Signs Last 24 Hrs  T(C): 36.8 (23 Aug 2017 09:01), Max: 36.8 (22 Aug 2017 15:52)  T(F): 98.2 (23 Aug 2017 09:01), Max: 98.3 (22 Aug 2017 21:30)  HR: 68 (23 Aug 2017 09:01) (53 - 100)  BP: 112/64 (23 Aug 2017 09:01) (96/50 - 124/88)  BP(mean): --  RR: 16 (23 Aug 2017 09:01) (15 - 16)  SpO2: 98% (23 Aug 2017 09:01) (97% - 98%)    Physical Exam:  Constitutional: NAD, AAOx3  Cardiovascular: +S1S2 RRR  Pulmonary: CTA b/l, unlabored  Abd: soft NTND +BS  Groins: C/D/I bilaterally; no bleeding, hematoma, edema  Extremities: no pedal edema, +distal pulses b/l  Neuro: non focal, REEVES x4    LABS:                        13.5   2.9   )-----------( 220      ( 23 Aug 2017 05:19 )             40.0     08-23    137  |  100  |  22.0<H>  ----------------------------<  97  4.4   |  23.0  |  0.80    Ca    9.1      23 Aug 2017 05:19  Mg     2.2     08-23        Urinalysis Basic - ( 21 Aug 2017 11:40 )    Color: Yellow / Appearance: Clear / S.015 / pH: x  Gluc: x / Ketone: Negative  / Bili: Negative / Urobili: Negative mg/dL   Blood: x / Protein: Negative mg/dL / Nitrite: Negative   Leuk Esterase: Small / RBC: 0-2 /HPF / WBC 3-5   Sq Epi: x / Non Sq Epi: x / Bacteria: x

## 2017-08-23 NOTE — DIETITIAN INITIAL EVALUATION ADULT. - OTHER INFO
Pt reports good po intake at meals.  Pt with slightly decreased po intake pta- [place] and does not like her sister's cooking.  Pt states that she tries to eat healthy.  Meal plan reviewed and literature provided in [place].

## 2017-08-24 LAB
ANION GAP SERPL CALC-SCNC: 16 MMOL/L — SIGNIFICANT CHANGE UP (ref 5–17)
BUN SERPL-MCNC: 33 MG/DL — HIGH (ref 8–20)
CALCIUM SERPL-MCNC: 9.6 MG/DL — SIGNIFICANT CHANGE UP (ref 8.6–10.2)
CHLORIDE SERPL-SCNC: 98 MMOL/L — SIGNIFICANT CHANGE UP (ref 98–107)
CO2 SERPL-SCNC: 21 MMOL/L — LOW (ref 22–29)
CREAT SERPL-MCNC: 0.75 MG/DL — SIGNIFICANT CHANGE UP (ref 0.5–1.3)
GLUCOSE SERPL-MCNC: 94 MG/DL — SIGNIFICANT CHANGE UP (ref 70–115)
HCT VFR BLD CALC: 42 % — SIGNIFICANT CHANGE UP (ref 37–47)
HGB BLD-MCNC: 14.1 G/DL — SIGNIFICANT CHANGE UP (ref 12–16)
INR BLD: 1.04 RATIO — SIGNIFICANT CHANGE UP (ref 0.88–1.16)
MAGNESIUM SERPL-MCNC: 2.2 MG/DL — SIGNIFICANT CHANGE UP (ref 1.6–2.6)
MCHC RBC-ENTMCNC: 30.1 PG — SIGNIFICANT CHANGE UP (ref 27–31)
MCHC RBC-ENTMCNC: 33.6 G/DL — SIGNIFICANT CHANGE UP (ref 32–36)
MCV RBC AUTO: 89.6 FL — SIGNIFICANT CHANGE UP (ref 81–99)
PLATELET # BLD AUTO: 256 K/UL — SIGNIFICANT CHANGE UP (ref 150–400)
POTASSIUM SERPL-MCNC: 4.2 MMOL/L — SIGNIFICANT CHANGE UP (ref 3.5–5.3)
POTASSIUM SERPL-SCNC: 4.2 MMOL/L — SIGNIFICANT CHANGE UP (ref 3.5–5.3)
PROTHROM AB SERPL-ACNC: 11.4 SEC — SIGNIFICANT CHANGE UP (ref 9.8–12.7)
RBC # BLD: 4.69 M/UL — SIGNIFICANT CHANGE UP (ref 4.4–5.2)
RBC # FLD: 11.8 % — SIGNIFICANT CHANGE UP (ref 11–15.6)
SODIUM SERPL-SCNC: 135 MMOL/L — SIGNIFICANT CHANGE UP (ref 135–145)
WBC # BLD: 3.4 K/UL — LOW (ref 4.8–10.8)
WBC # FLD AUTO: 3.4 K/UL — LOW (ref 4.8–10.8)

## 2017-08-24 PROCEDURE — 33249 INSJ/RPLCMT DEFIB W/LEAD(S): CPT | Mod: 59

## 2017-08-24 PROCEDURE — 71010: CPT | Mod: 26

## 2017-08-24 PROCEDURE — 93010 ELECTROCARDIOGRAM REPORT: CPT

## 2017-08-24 PROCEDURE — 99233 SBSQ HOSP IP/OBS HIGH 50: CPT

## 2017-08-24 RX ORDER — ACETAMINOPHEN 500 MG
650 TABLET ORAL EVERY 6 HOURS
Qty: 0 | Refills: 0 | Status: DISCONTINUED | OUTPATIENT
Start: 2017-08-24 | End: 2017-08-25

## 2017-08-24 RX ORDER — ONDANSETRON 8 MG/1
4 TABLET, FILM COATED ORAL EVERY 6 HOURS
Qty: 0 | Refills: 0 | Status: DISCONTINUED | OUTPATIENT
Start: 2017-08-24 | End: 2017-08-25

## 2017-08-24 RX ORDER — METOPROLOL TARTRATE 50 MG
50 TABLET ORAL DAILY
Qty: 0 | Refills: 0 | Status: DISCONTINUED | OUTPATIENT
Start: 2017-08-25 | End: 2017-08-25

## 2017-08-24 RX ORDER — HYDROMORPHONE HYDROCHLORIDE 2 MG/ML
1 INJECTION INTRAMUSCULAR; INTRAVENOUS; SUBCUTANEOUS ONCE
Qty: 0 | Refills: 0 | Status: DISCONTINUED | OUTPATIENT
Start: 2017-08-24 | End: 2017-08-24

## 2017-08-24 RX ORDER — DIPHENHYDRAMINE HCL 50 MG
25 CAPSULE ORAL ONCE
Qty: 0 | Refills: 0 | Status: COMPLETED | OUTPATIENT
Start: 2017-08-24 | End: 2017-08-24

## 2017-08-24 RX ORDER — ALPRAZOLAM 0.25 MG
0.25 TABLET ORAL
Qty: 0 | Refills: 0 | Status: DISCONTINUED | OUTPATIENT
Start: 2017-08-24 | End: 2017-08-25

## 2017-08-24 RX ORDER — OXYCODONE AND ACETAMINOPHEN 5; 325 MG/1; MG/1
1 TABLET ORAL EVERY 6 HOURS
Qty: 0 | Refills: 0 | Status: DISCONTINUED | OUTPATIENT
Start: 2017-08-24 | End: 2017-08-25

## 2017-08-24 RX ORDER — CEFAZOLIN SODIUM 1 G
2000 VIAL (EA) INJECTION
Qty: 0 | Refills: 0 | Status: COMPLETED | OUTPATIENT
Start: 2017-08-24 | End: 2017-08-25

## 2017-08-24 RX ORDER — FUROSEMIDE 40 MG
20 TABLET ORAL DAILY
Qty: 0 | Refills: 0 | Status: DISCONTINUED | OUTPATIENT
Start: 2017-08-25 | End: 2017-08-25

## 2017-08-24 RX ADMIN — Medication 25 MILLIGRAM(S): at 23:27

## 2017-08-24 RX ADMIN — AMLODIPINE BESYLATE 20 MILLIGRAM(S): 2.5 TABLET ORAL at 05:58

## 2017-08-24 RX ADMIN — OXYCODONE AND ACETAMINOPHEN 1 TABLET(S): 5; 325 TABLET ORAL at 16:16

## 2017-08-24 RX ADMIN — Medication 650 MILLIGRAM(S): at 20:13

## 2017-08-24 RX ADMIN — Medication 650 MILLIGRAM(S): at 21:32

## 2017-08-24 RX ADMIN — SPIRONOLACTONE 25 MILLIGRAM(S): 25 TABLET, FILM COATED ORAL at 05:58

## 2017-08-24 RX ADMIN — HYDROMORPHONE HYDROCHLORIDE 1 MILLIGRAM(S): 2 INJECTION INTRAMUSCULAR; INTRAVENOUS; SUBCUTANEOUS at 23:22

## 2017-08-24 RX ADMIN — LOSARTAN POTASSIUM 25 MILLIGRAM(S): 100 TABLET, FILM COATED ORAL at 05:58

## 2017-08-24 RX ADMIN — Medication 100 MILLIGRAM(S): at 05:58

## 2017-08-24 RX ADMIN — HYDROMORPHONE HYDROCHLORIDE 1 MILLIGRAM(S): 2 INJECTION INTRAMUSCULAR; INTRAVENOUS; SUBCUTANEOUS at 21:35

## 2017-08-24 RX ADMIN — Medication 100 MILLIGRAM(S): at 20:15

## 2017-08-24 NOTE — DISCHARGE NOTE ADULT - HOSPITAL COURSE
47 year old female with recently diagnosed cardiomyopathy- NICM;  previously seen at [facility] 5/19/2017 at that time LVEF 15-20% low normal Rv systolic function trace MR, x ray revealed a pleural effusion s/p thoracentesis and load reduction therapy and a Cardiac CT 6/21/2017 which revealed nonobstructive CAD. The patient underwent a repeat 2-D echocardiogram on 7/14/2017  which revealed persistent/worsening LV systolic dysfunction LVEF < 15%. Currently pt presented with complaints of worsening JOHNSON x 2 weeks. Pt admitted with acute on chronic systolic HF exacerbation, on IV diuresis which was switched to po and optimization with medical therapy. EP consulted and repeat TTE showed persistent LV dysfunction <20%. S/p ICD placement on 8/24/17. Patient's medications were adjusted and norvasc and imdur were discontinued due to hypotension. Lasix and aldactone were added tot he patients regimen. Patient to f/u with EP Dr. Juárez in 2 weeks for device check and further medication optimization at [facility] HF clinic.     Discharge planning took 45 minutes 47 year old female with recently diagnosed cardiomyopathy- NICM;  previously seen at [facility] 5/19/2017 at that time LVEF 15-20% low normal Rv systolic function trace MR, x ray revealed a pleural effusion s/p thoracentesis and load reduction therapy and a Cardiac CT 6/21/2017 which revealed nonobstructive CAD. The patient underwent a repeat 2-D echocardiogram on 7/14/2017  which revealed persistent/worsening LV systolic dysfunction LVEF < 15%. Currently pt presented with complaints of worsening JOHNSON x 2 weeks. Pt admitted with acute on chronic systolic HF exacerbation, on IV diuresis which was switched to po and optimization with medical therapy. EP consulted and repeat TTE showed persistent LV dysfunction <20%. S/p ICD placement on 8/24/17. Patient's medications were adjusted and Norvasc and Imdur were discontinued due to hypotension. Lasix and aldactone were added tot he patients regimen. Patient to f/u with EP Dr. Juárez in 2 weeks for device check and further medication optimization at [facility] HF clinic.     PHYSICAL EXAM:    Vital Signs Last 24 Hrs  T(C): 36.8 (25 Aug 2017 07:35), Max: 37.1 (24 Aug 2017 21:42)  T(F): 98.3 (25 Aug 2017 07:35), Max: 98.8 (24 Aug 2017 21:42)  HR: 68 (25 Aug 2017 07:35) (60 - 74)  BP: 106/68 (25 Aug 2017 07:35) (94/54 - 112/70)  BP(mean): --  RR: 18 (25 Aug 2017 07:35) (18 - 22)  SpO2: 96% (25 Aug 2017 05:22) (96% - 98%)    CONSTITUTIONAL: Well appearing, well nourished, awake, alert and in no apparent distress  CARDIAC: Normal rate, regular rhythm.  Heart sounds S1, S2.  No murmurs, rubs or gallops   RESPIRATORY: Decreased air entry, bibasilar crackles   GASTROENTEROLOGY: Soft, NT ND BS+ normoactive   EXTREMITIES: No edema, cyanosis or deformity   NEUROLOGICAL: Alert and oriented, no focal deficits, no motor or sensory deficits.  SKIN: No rash, skin turgor wnl  left chest ICD w/ dressing C/D/I         Discharge planning took 45 minutes 47 year old female with recently diagnosed cardiomyopathy- NICM;  previously seen at [facility] 5/19/2017 at that time LVEF 15-20% low normal Rv systolic function trace MR, x ray revealed a pleural effusion s/p thoracentesis and load reduction therapy and a Cardiac CT 6/21/2017 which revealed nonobstructive CAD. The patient underwent a repeat 2-D echocardiogram on 7/14/2017  which revealed persistent/worsening LV systolic dysfunction LVEF < 15%. Currently pt presented with complaints of worsening JOHNSON x 2 weeks. Pt admitted with acute on chronic systolic HF exacerbation, on IV diuresis which was switched to po and optimization with medical therapy. EP consulted and repeat TTE showed persistent LV dysfunction <20%. S/p ICD placement on 8/24/17. Patient's medications were adjusted and Norvasc and Imdur were discontinued due to hypotension (Ok with cardiology). Lasix and aldactone were added tot he patients regimen. Patient to f/u with EP Dr. Juárez next week for device check and further medication optimization at [facility] HF clinic.     PHYSICAL EXAM:    Vital Signs Last 24 Hrs  T(C): 36.8 (25 Aug 2017 07:35), Max: 37.1 (24 Aug 2017 21:42)  T(F): 98.3 (25 Aug 2017 07:35), Max: 98.8 (24 Aug 2017 21:42)  HR: 68 (25 Aug 2017 07:35) (60 - 74)  BP: 106/68 (25 Aug 2017 07:35) (94/54 - 112/70)  BP(mean): --  RR: 18 (25 Aug 2017 07:35) (18 - 22)  SpO2: 96% (25 Aug 2017 05:22) (96% - 98%)    CONSTITUTIONAL: Well appearing, well nourished, awake, alert and in no apparent distress  CARDIAC: Normal rate, regular rhythm.  Heart sounds S1, S2.  No murmurs, rubs or gallops   RESPIRATORY: Decreased air entry, bibasilar crackles   GASTROENTEROLOGY: Soft, NT ND BS+ normoactive   EXTREMITIES: No edema, cyanosis or deformity   NEUROLOGICAL: Alert and oriented, no focal deficits, no motor or sensory deficits.  SKIN: No rash, skin turgor wnl  left chest ICD w/ dressing C/D/I         Discharge planning took 45 minutes

## 2017-08-24 NOTE — DISCHARGE NOTE ADULT - CARE PLAN
Principal Discharge DX:	Cardiomyopathy  Goal:	s/p ICD implant  Instructions for follow-up, activity and diet:	- Bruising around the implant site or over the chest, side or arm near the incision is normal, and will take a few weeks to resolve.  - Leave the sutures in place. They will be removed at your follow up appointment.   -Do not lift the affected arm higher than 90 degrees (shoulder height) in any direction for 6 weeks.   - Do not push, pull or lift anything heavier than 10 lbs (about a gallon of milk) with the affected arm for 4 weeks.     - Do not touch the incision until it is completely healed.   - Do not apply soaps, creams, lotions, ointments or powders to the incision until it is completely healed.  You should call the doctor if:   - you notice redness, drainage, swelling, increased tenderness, hot sensation around the  incision, bleeding or incision edges pulling apart.  - your temperature is greater than 100 degress F for more than 24 hours.  - you notice swelling or bulging at the incision or around the device that was not there when you left the hospital or is increasing in size.  -you experience increased difficulty breathing.  - you notice new/worsening swelling in your legs and ankles.  - you faint or have dizzy spells.  -you have any questions or concerns regarding your device or the procedure. Principal Discharge DX:	Cardiomyopathy  Goal:	s/p ICD implant  Instructions for follow-up, activity and diet:	- Bruising around the implant site or over the chest, side or arm near the incision is normal, and will take a few weeks to resolve.  - Leave the sutures in place. They will be removed at your follow up appointment.   -Do not lift the affected arm higher than 90 degrees (shoulder height) in any direction for 6 weeks.   - Do not push, pull or lift anything heavier than 10 lbs (about a gallon of milk) with the affected arm for 4 weeks.     - Do not touch the incision until it is completely healed.   - Do not apply soaps, creams, lotions, ointments or powders to the incision until it is completely healed.  You should call the doctor if:   - you notice redness, drainage, swelling, increased tenderness, hot sensation around the  incision, bleeding or incision edges pulling apart.  - your temperature is greater than 100 degress F for more than 24 hours.  - you notice swelling or bulging at the incision or around the device that was not there when you left the hospital or is increasing in size.  -you experience increased difficulty breathing.  - you notice new/worsening swelling in your legs and ankles.  - you faint or have dizzy spells.  -you have any questions or concerns regarding your device or the procedure.  Please follow up with Dr. Juárez as directed.  Secondary Diagnosis:	Essential hypertension  Instructions for follow-up, activity and diet:	Continue with medications as prescribed. Principal Discharge DX:	Cardiomyopathy  Goal:	s/p ICD implant  Instructions for follow-up, activity and diet:	- Bruising around the implant site or over the chest, side or arm near the incision is normal, and will take a few weeks to resolve.  - Leave the sutures in place. They will be removed at your follow up appointment.   -Do not lift the affected arm higher than 90 degrees (shoulder height) in any direction for 6 weeks.   - Do not push, pull or lift anything heavier than 10 lbs (about a gallon of milk) with the affected arm for 4 weeks.     - Do not touch the incision until it is completely healed.   - Do not apply soaps, creams, lotions, ointments or powders to the incision until it is completely healed.  You should call the doctor if:   - you notice redness, drainage, swelling, increased tenderness, hot sensation around the  incision, bleeding or incision edges pulling apart.  - your temperature is greater than 100 degress F for more than 24 hours.  - you notice swelling or bulging at the incision or around the device that was not there when you left the hospital or is increasing in size.  -you experience increased difficulty breathing.  - you notice new/worsening swelling in your legs and ankles.  - you faint or have dizzy spells.  -you have any questions or concerns regarding your device or the procedure.  Please follow up with Dr. Juárez as directed.  Secondary Diagnosis:	Essential hypertension  Instructions for follow-up, activity and diet:	Continue with medications as prescribed.  Secondary Diagnosis:	Tinea  Instructions for follow-up, activity and diet:	Continue with miconazole ointment and finish course, if worsens or persists consider dermatologist evaluation. Principal Discharge DX:	Cardiomyopathy  Goal:	s/p ICD implant  Instructions for follow-up, activity and diet:	- Bruising around the implant site or over the chest, side or arm near the incision is normal, and will take a few weeks to resolve.  - Leave the sutures in place. They will be removed at your follow up appointment.   -Do not lift the affected arm higher than 90 degrees (shoulder height) in any direction for 6 weeks.   - Do not push, pull or lift anything heavier than 10 lbs (about a gallon of milk) with the affected arm for 4 weeks.     - Do not touch the incision until it is completely healed.   - Do not apply soaps, creams, lotions, ointments or powders to the incision until it is completely healed.  You should call the doctor if:   - you notice redness, drainage, swelling, increased tenderness, hot sensation around the  incision, bleeding or incision edges pulling apart.  - your temperature is greater than 100 degrees F for more than 24 hours.  - you notice swelling or bulging at the incision or around the device that was not there when you left the hospital or is increasing in size.  -you experience increased difficulty breathing.  - you notice new/worsening swelling in your legs and ankles.  - you faint or have dizzy spells.  -you have any questions or concerns regarding your device or the procedure.  Please follow up with Dr. Juárez as directed.  Secondary Diagnosis:	Essential hypertension  Instructions for follow-up, activity and diet:	Continue with medications as prescribed.  Secondary Diagnosis:	Tinea  Instructions for follow-up, activity and diet:	Continue with miconazole ointment and finish course, if worsens or persists consider dermatologist evaluation. Principal Discharge DX:	Cardiomyopathy  Goal:	s/p ICD implant  Instructions for follow-up, activity and diet:	- Bruising around the implant site or over the chest, side or arm near the incision is normal, and will take a few weeks to resolve.  - Leave the sutures in place. They will be removed at your follow up appointment.   -Do not lift the affected arm higher than 90 degrees (shoulder height) in any direction for 6 weeks.   - Do not push, pull or lift anything heavier than 10 lbs (about a gallon of milk) with the affected arm for 4 weeks.     - Do not touch the incision until it is completely healed.   - Do not apply soaps, creams, lotions, ointments or powders to the incision until it is completely healed.  You should call the doctor if:   - you notice redness, drainage, swelling, increased tenderness, hot sensation around the  incision, bleeding or incision edges pulling apart.  - your temperature is greater than 100 degrees F for more than 24 hours.  - you notice swelling or bulging at the incision or around the device that was not there when you left the hospital or is increasing in size.  -you experience increased difficulty breathing.  - you notice new/worsening swelling in your legs and ankles.  - you faint or have dizzy spells.  -you have any questions or concerns regarding your device or the procedure.  Please follow up with Dr. Juárez as directed.  Secondary Diagnosis:	Essential hypertension  Instructions for follow-up, activity and diet:	Continue with medications as prescribed. Make sure to follow with cardiology as mentioned and also with PCP in DR.  Secondary Diagnosis:	Tinea  Instructions for follow-up, activity and diet:	Continue with miconazole ointment and finish course, if worsens or persists consider dermatologist evaluation.

## 2017-08-24 NOTE — DISCHARGE NOTE ADULT - MEDICATION SUMMARY - MEDICATIONS TO TAKE
I will START or STAY ON the medications listed below when I get home from the hospital:    spironolactone 25 mg oral tablet  -- 1 tab(s) by mouth once a day  -- Indication: For Cardiomyopathy    Actamin 325 mg oral tablet  -- 2 tab(s) by mouth every 6 hours, As needed, Mild Pain (1 - 3) MDD:As needed for pain.  -- Indication: For Pain    losartan 25 mg oral tablet  -- 1 tab(s) by mouth once a day  -- Indication: For Cardiomyopathy    Mondoxyne  mg oral capsule  -- 1 cap(s) by mouth every 12 hours  -- Indication: For INfection    metoprolol succinate 50 mg oral tablet, extended release  -- 1 tab(s) by mouth once a day  -- Indication: For Cardiomyopathy    Lasix 20 mg oral tablet  -- 1 tab(s) by mouth once a day  -- Indication: For Cardiomyopathy

## 2017-08-24 NOTE — DISCHARGE NOTE ADULT - CARE PROVIDER_API CALL
Jamel Juárez (MD; MPH), Cardiac Electrophysiology; Cardiovascular Disease; Internal Medicine  [address] [place] [id number]  Phone: [phone]  Fax: [phone]

## 2017-08-24 NOTE — PROGRESS NOTE ADULT - PROBLEM SELECTOR PLAN 1
NICM HFrEF <20%   -pt with symptoms of JOHNSON/ fatigue   -Continue load reduction therapy  -lasix changed from IV to lasix 20mg po qd    -c/w losartan 25mg po qd   -decreased toprol xl 100mg po qd to 50mg po qd due to episodes of hypotension  -c/w aldactone 25mg po qd   -discontinued imdur 30mg po qd for now and if bp tolerates can add at a later date   -c/w daily weight/strict I/Os    -tsh wnl   -repeat TTE showed LVEF <20%   -EP f/u noted and appreciated and pt s/p single chamber primary prevention -BSCI ICD (VVI 50bpm).  -As per EP pt to c/w  doxycycline 100mg po bid x 5 days   -D/w Dr. Juárez and EP NP Nidia and pt to have site check in the am, EKG, repeat labs and if cleared by EP will d/c to f/u as an outpt in 2 weeks with Dr Juárez at [facility] clinic for device check and suture removal. NICM HFrEF <20% on medical therapy with persistent LV dysfunction   -pt with symptoms of JOHNSON/ fatigue   -Continue load reduction therapy  -lasix changed from IV to lasix 20mg po qd    -c/w losartan 25mg po qd   -decreased toprol xl 100mg po qd to 50mg po qd due to episodes of hypotension  -c/w aldactone 25mg po qd   -discontinued imdur 30mg po qd for now and if bp tolerates can add at a later date   -c/w daily weight/strict I/Os    -tsh wnl   -repeat TTE showed LVEF <20%   -EP f/u noted and appreciated and pt s/p single chamber primary prevention -BSCI ICD (VVI 50bpm).  -As per EP pt to c/w  doxycycline 100mg po bid x 5 days   -D/w Dr. Juárez and EP NP Nidia and pt to have site check in the am, EKG, repeat labs and if cleared by EP will d/c to f/u as an outpt in 2 weeks with Dr Juárez at [facility] clinic for device check and suture removal.

## 2017-08-24 NOTE — PROGRESS NOTE ADULT - SUBJECTIVE AND OBJECTIVE BOX
Pt s/p single chamber BSCI ICD to LACW.      ECG:  CXR prelim:      MEDICATIONS  (STANDING):  isosorbide   mononitrate ER Tablet (IMDUR) 30 milliGRAM(s) Oral daily  losartan 25 milliGRAM(s) Oral daily  metoprolol succinate  milliGRAM(s) Oral daily  spironolactone 25 milliGRAM(s) Oral daily  amLODIPine   Tablet 20 milliGRAM(s) Oral daily  miconazole 2% Cream 1 Application(s) Topical two times a day  furosemide   Injectable 20 milliGRAM(s) IV Push every 12 hours      PAST MEDICAL & SURGICAL HISTORY:  Essential hypertension  Bradycardia  H/O bilateral breast reduction surgery      Vital Signs Last 24 Hrs  HR: 68   BP: 103/67   RR: 18   SpO2: 98%     Physical Exam:  Constitutional: NAD, AAOx3  Cardiovascular: +S1S2 RRR  Pulmonary: CTA b/l, unlabored  GI: soft NTND +BS  Extremities: no pedal edema, +distal pulses b/l  Neuro: non focal, REEVES x4    LABS:                        14.1   3.4   )-----------( 256      ( 24 Aug 2017 05:33 )             42.0     08-24    135  |  98  |  33.0<H>  ----------------------------<  94  4.2   |  21.0<L>  |  0.75    Ca    9.6      24 Aug 2017 05:33  Mg     2.2     08-24      PT/INR - ( 24 Aug 2017 05:33 )   PT: 11.4 sec;   INR: 1.04 ratio      A/P: 48 yo female with NICM EF <20% on optimal meds >3months s/p single chamber BSCI ICD.    -continue tele monitoring  -AM ECG, LABS, CXR PA/LAT, BSCI interrogation  -ancef 2 grams IV Q8hrs x 2 more doses, next at 9pm  -no lifting left arm over shoulder Pt s/p single chamber BSCI ICD to LACW (VVI 50bpm).    ECG: NSR, prolonged AV delay, LVH, poor R wave progression  CXR prelim: no PTX, good lead placement. cardiomegaly      MEDICATIONS  (STANDING):  isosorbide   mononitrate ER Tablet (IMDUR) 30 milliGRAM(s) Oral daily  losartan 25 milliGRAM(s) Oral daily  metoprolol succinate  milliGRAM(s) Oral daily  spironolactone 25 milliGRAM(s) Oral daily  amLODIPine   Tablet 20 milliGRAM(s) Oral daily  miconazole 2% Cream 1 Application(s) Topical two times a day  furosemide   Injectable 20 milliGRAM(s) IV Push every 12 hours      PAST MEDICAL & SURGICAL HISTORY:  Essential hypertension  Bradycardia  H/O bilateral breast reduction surgery      Vital Signs Last 24 Hrs  HR: 71  BP: 96/66  RR: 18   SpO2: 98%     Physical Exam:  Constitutional: NAD, awake  Cardiovascular: +S1S2 RRR  LACW device site:+ pressure dressing c/d/i without bleeding, swelling, hematoma +2radial pulse  Pulmonary: CTA b/l, unlabored  GI: soft NTND +BS  Extremities: no pedal edema, +distal pulses b/l  Neuro: non focal, REEVES x4    LABS:                        14.1   3.4   )-----------( 256      ( 24 Aug 2017 05:33 )             42.0     08-24    135  |  98  |  33.0<H>  ----------------------------<  94  4.2   |  21.0<L>  |  0.75    Ca    9.6      24 Aug 2017 05:33  Mg     2.2     08-24      PT/INR - ( 24 Aug 2017 05:33 )   PT: 11.4 sec;   INR: 1.04 ratio      A/P: 48 yo female with NICM EF <20% on optimal meds >3months s/p single chamber primary prevention  BSCI ICD (VVI 50bpm).    -continue tele monitoring  -AM ECG, LABS, CXR PA/LAT, BSCI interrogation  -ancef 2 grams IV Q8hrs x 2 more doses, next at 9pm, then doxycycline 100mg po bid x 5 days  -no lifting left arm over shoulder x 6 weeks, keep site dry x 1 week  -outpt follow up 2 weeks Dr Juárez at [facility] clinic for device check and suture removal. Follow up [facility] CHF clinic 2-4 weeks.  -continue metoprolol, losartan, spironolactone, imdur  above dw Dr Juárez agrees Pt s/p single chamber BSCI ICD to LACW (VVI 50bpm).    ECG: NSR, prolonged AV delay, LVH, poor R wave progression  CXR prelim: no PTX, good lead placement. cardiomegaly      MEDICATIONS  (STANDING):  isosorbide   mononitrate ER Tablet (IMDUR) 30 milliGRAM(s) Oral daily  losartan 25 milliGRAM(s) Oral daily  metoprolol succinate  milliGRAM(s) Oral daily  spironolactone 25 milliGRAM(s) Oral daily  amLODIPine   Tablet 20 milliGRAM(s) Oral daily  miconazole 2% Cream 1 Application(s) Topical two times a day  furosemide   Injectable 20 milliGRAM(s) IV Push every 12 hours      PAST MEDICAL & SURGICAL HISTORY:  Essential hypertension  Bradycardia  H/O bilateral breast reduction surgery      Vital Signs Last 24 Hrs  HR: 71  BP: 96/66  RR: 18   SpO2: 98%     Physical Exam:  Constitutional: NAD, awake  Cardiovascular: +S1S2 RRR  LACW device site:+ pressure dressing c/d/i without bleeding, swelling, hematoma +2radial pulse  Pulmonary: CTA b/l, unlabored  GI: soft NTND +BS  Extremities: no pedal edema, +distal pulses b/l  Neuro: non focal, REEVES x4    LABS:                        14.1   3.4   )-----------( 256      ( 24 Aug 2017 05:33 )             42.0     08-24    135  |  98  |  33.0<H>  ----------------------------<  94  4.2   |  21.0<L>  |  0.75    Ca    9.6      24 Aug 2017 05:33  Mg     2.2     08-24      PT/INR - ( 24 Aug 2017 05:33 )   PT: 11.4 sec;   INR: 1.04 ratio      A/P: 48 yo female with NICM EF <20% on optimal meds >3months s/p single chamber primary prevention  BSCI ICD (VVI 50bpm).    -continue tele monitoring  -AM ECG, LABS, CXR PA/LAT, BSCI interrogation  -ancef 2 grams IV Q8hrs x 2 more doses, next at 9pm, then doxycycline 100mg po bid x 5 days  -no lifting left arm over shoulder x 6 weeks, keep site dry x 1 week  -outpt follow up 2 weeks Dr Juárez at [facility] clinic for device check and suture removal. Follow up [facility] CHF clinic 2-4 weeks.  -continue metoprolol, losartan, spironolactone, imdur  -device care, restrictions, follow up and shock plan reviewed with pt via  [employer]  -s/w case management Aleksandra to speak with pt regarding [place] and returning to [place]  above dw Dr Juárez agrees

## 2017-08-24 NOTE — DISCHARGE NOTE ADULT - ADDITIONAL INSTRUCTIONS
Please schedule a 2 week post ICD follow for device check and suture removal with Dr Juárez at [facility] [phone] Please schedule a 2 week post ICD follow for device check and suture removal with Dr Juárez at [facility] [phone]. At that time further medical optimization of your medications will be completed. Please follow up with your primary care physician when you return home to [place]. Please post ICD follow up next week for device check and suture removal with Dr Juárez at [facility] [phone]. At that time further medical optimization of your medications will be completed. Please follow up with your primary care physician when you return home to [place].

## 2017-08-24 NOTE — DISCHARGE NOTE ADULT - PLAN OF CARE
s/p ICD implant - Bruising around the implant site or over the chest, side or arm near the incision is normal, and will take a few weeks to resolve.  - Leave the sutures in place. They will be removed at your follow up appointment.   -Do not lift the affected arm higher than 90 degrees (shoulder height) in any direction for 6 weeks.   - Do not push, pull or lift anything heavier than 10 lbs (about a gallon of milk) with the affected arm for 4 weeks.     - Do not touch the incision until it is completely healed.   - Do not apply soaps, creams, lotions, ointments or powders to the incision until it is completely healed.  You should call the doctor if:   - you notice redness, drainage, swelling, increased tenderness, hot sensation around the  incision, bleeding or incision edges pulling apart.  - your temperature is greater than 100 degress F for more than 24 hours.  - you notice swelling or bulging at the incision or around the device that was not there when you left the hospital or is increasing in size.  -you experience increased difficulty breathing.  - you notice new/worsening swelling in your legs and ankles.  - you faint or have dizzy spells.  -you have any questions or concerns regarding your device or the procedure. Continue with medications as prescribed. - Bruising around the implant site or over the chest, side or arm near the incision is normal, and will take a few weeks to resolve.  - Leave the sutures in place. They will be removed at your follow up appointment.   -Do not lift the affected arm higher than 90 degrees (shoulder height) in any direction for 6 weeks.   - Do not push, pull or lift anything heavier than 10 lbs (about a gallon of milk) with the affected arm for 4 weeks.     - Do not touch the incision until it is completely healed.   - Do not apply soaps, creams, lotions, ointments or powders to the incision until it is completely healed.  You should call the doctor if:   - you notice redness, drainage, swelling, increased tenderness, hot sensation around the  incision, bleeding or incision edges pulling apart.  - your temperature is greater than 100 degress F for more than 24 hours.  - you notice swelling or bulging at the incision or around the device that was not there when you left the hospital or is increasing in size.  -you experience increased difficulty breathing.  - you notice new/worsening swelling in your legs and ankles.  - you faint or have dizzy spells.  -you have any questions or concerns regarding your device or the procedure.  Please follow up with Dr. Juárez as directed. Continue with miconazole ointment and finish course, if worsens or persists consider dermatologist evaluation. - Bruising around the implant site or over the chest, side or arm near the incision is normal, and will take a few weeks to resolve.  - Leave the sutures in place. They will be removed at your follow up appointment.   -Do not lift the affected arm higher than 90 degrees (shoulder height) in any direction for 6 weeks.   - Do not push, pull or lift anything heavier than 10 lbs (about a gallon of milk) with the affected arm for 4 weeks.     - Do not touch the incision until it is completely healed.   - Do not apply soaps, creams, lotions, ointments or powders to the incision until it is completely healed.  You should call the doctor if:   - you notice redness, drainage, swelling, increased tenderness, hot sensation around the  incision, bleeding or incision edges pulling apart.  - your temperature is greater than 100 degrees F for more than 24 hours.  - you notice swelling or bulging at the incision or around the device that was not there when you left the hospital or is increasing in size.  -you experience increased difficulty breathing.  - you notice new/worsening swelling in your legs and ankles.  - you faint or have dizzy spells.  -you have any questions or concerns regarding your device or the procedure.  Please follow up with Dr. Juárez as directed. Continue with medications as prescribed. Make sure to follow with cardiology as mentioned and also with PCP in

## 2017-08-24 NOTE — DISCHARGE NOTE ADULT - MEDICATION SUMMARY - MEDICATIONS TO STOP TAKING
I will STOP taking the medications listed below when I get home from the hospital:    amLODIPine 10 mg oral tablet  -- 2 tab(s) by mouth once a day    isosorbide mononitrate 30 mg oral tablet, extended release  -- 1 tab(s) by mouth once a day (in the morning)

## 2017-08-24 NOTE — PROGRESS NOTE ADULT - PROBLEM SELECTOR PLAN 3
Pt with episodes of hypotension  -d/c norvasc/imdur and will c/w metoprolol at decreased dose as stated above, lasix/ aldactone
-bp stable   -c/w norvasc 20mg po qd

## 2017-08-24 NOTE — PROGRESS NOTE ADULT - SUBJECTIVE AND OBJECTIVE BOX
Patient is a 47y old  Female who presents with a chief complaint of SOB, palpitations, chest pain (24 Aug 2017 13:19)      HEALTH ISSUES - PROBLEM Dx:  Prophylactic measure: Prophylactic measure  Essential hypertension: Essential hypertension  Tinea: Tinea  Cardiomyopathy: Cardiomyopathy        INTERVAL HPI/OVERNIGHT EVENTS:  Patient seen and examined at bedside. No acute events overnight. S/P ICD placement Patient states she is feeling well, pain at incision site, otherwise no complaints. Patient denies chest pain, SOB, abd pain, N/V, fever, chills, dysuria or any other complaints. All remainder ROS negative.     Vital Signs Last 24 Hrs  T(C): 36.8 (24 Aug 2017 17:29), Max: 36.8 (24 Aug 2017 17:29)  T(F): 98.2 (24 Aug 2017 17:29), Max: 98.2 (24 Aug 2017 17:29)  HR: 60 (24 Aug 2017 19:58) (60 - 74)  BP: 94/54 (24 Aug 2017 17:29) (94/54 - 103/67)  BP(mean): --  RR: 20 (24 Aug 2017 17:29) (10 - 26)  SpO2: 97% (24 Aug 2017 17:29) (96% - 100%)        CONSTITUTIONAL: Well appearing, well nourished, awake, alert and in no apparent distress  CARDIAC: Normal rate, regular rhythm.  Heart sounds S1, S2.  No murmurs, rubs or gallops   RESPIRATORY: Decreased air entry, bibasilar crackles   GASTROENTEROLOGY: Soft, NT ND BS+ normoactive   EXTREMITIES: No edema, cyanosis or deformity   NEUROLOGICAL: Alert and oriented, no focal deficits, no motor or sensory deficits.  SKIN: No rash, skin turgor wnl  left chest ICD w/ dressing C/D/I     I&O's Summary    23 Aug 2017 07:01  -  24 Aug 2017 07:00  --------------------------------------------------------  IN: 0 mL / OUT: 1450 mL / NET: -1450 mL    24 Aug 2017 07:01  -  24 Aug 2017 20:19  --------------------------------------------------------  IN: 240 mL / OUT: 400 mL / NET: -160 mL        CONSTITUTIONAL: Well appearing, well nourished, awake, alert and in no apparent distress  ENMT: Airway patent, Nasal mucosa clear. Mouth with normal mucosa. Throat has no vesicles, no oropharyngeal exudates and uvula is midline.  EYES: Clear bilaterally, pupils equal, round and reactive to light. EOMI.  CARDIAC: Normal rate, regular rhythm.  Heart sounds S1, S2.  No murmurs, rubs or gallops   RESPIRATORY: Breath sounds clear and equal bilaterally. No wheezes, rhales or rhonchi  MUSCULOSKELETAL: Spine appears normal, range of motion is not limited, no muscle or joint tenderness  EXTREMITIES: No edema, cyanosis or deformity   NEUROLOGICAL: Alert and oriented, no focal deficits, no motor or sensory deficits.  SKIN: No rash, skin turgor     MEDICATIONS  (STANDING):  isosorbide   mononitrate ER Tablet (IMDUR) 30 milliGRAM(s) Oral daily  losartan 25 milliGRAM(s) Oral daily  spironolactone 25 milliGRAM(s) Oral daily  miconazole 2% Cream 1 Application(s) Topical two times a day  ceFAZolin   IVPB 2000 milliGRAM(s) IV Intermittent <User Schedule>    MEDICATIONS  (PRN):  acetaminophen   Tablet. 650 milliGRAM(s) Oral every 6 hours PRN Mild Pain (1 - 3)  acetaminophen   Tablet. 650 milliGRAM(s) Oral every 6 hours PRN Mild Pain (1 - 3)  oxyCODONE    5 mG/acetaminophen 325 mG 1 Tablet(s) Oral every 6 hours PRN Moderate Pain (4 - 6)  ALPRAZolam 0.25 milliGRAM(s) Oral four times a day PRN anxiety or insomnia  ondansetron Injectable 4 milliGRAM(s) IV Push every 6 hours PRN Nausea and/or Vomiting      LABS:                        14.1   3.4   )-----------( 256      ( 24 Aug 2017 05:33 )             42.0     08-24    135  |  98  |  33.0<H>  ----------------------------<  94  4.2   |  21.0<L>  |  0.75    Ca    9.6      24 Aug 2017 05:33  Mg     2.2     08-24      PT/INR - ( 24 Aug 2017 05:33 )   PT: 11.4 sec;   INR: 1.04 ratio           RADIOLOGY & ADDITIONAL TESTS:  No new imaging studies

## 2017-08-24 NOTE — PROGRESS NOTE ADULT - ATTENDING COMMENTS
Dispo: D/c home in the am after cleared by EP for disposition. SW consulted b/c pt is having issues with her Visa and needs further clarification and guidance in regards to this matter.

## 2017-08-24 NOTE — DISCHARGE NOTE ADULT - PATIENT PORTAL LINK FT
“You can access the Follow[employer] Patient Portal, offered by [facility], by registering with the following website: [email or web address]”

## 2017-08-25 VITALS — SYSTOLIC BLOOD PRESSURE: 118 MMHG | DIASTOLIC BLOOD PRESSURE: 83 MMHG | TEMPERATURE: 99 F

## 2017-08-25 LAB
ABO RH CONFIRMATION: SIGNIFICANT CHANGE UP
ANION GAP SERPL CALC-SCNC: 16 MMOL/L — SIGNIFICANT CHANGE UP (ref 5–17)
BASOPHILS # BLD AUTO: 0 K/UL — SIGNIFICANT CHANGE UP (ref 0–0.2)
BASOPHILS NFR BLD AUTO: 0.2 % — SIGNIFICANT CHANGE UP (ref 0–2)
BUN SERPL-MCNC: 34 MG/DL — HIGH (ref 8–20)
CALCIUM SERPL-MCNC: 9.3 MG/DL — SIGNIFICANT CHANGE UP (ref 8.6–10.2)
CHLORIDE SERPL-SCNC: 97 MMOL/L — LOW (ref 98–107)
CO2 SERPL-SCNC: 22 MMOL/L — SIGNIFICANT CHANGE UP (ref 22–29)
CREAT SERPL-MCNC: 0.79 MG/DL — SIGNIFICANT CHANGE UP (ref 0.5–1.3)
EOSINOPHIL # BLD AUTO: 0.1 K/UL — SIGNIFICANT CHANGE UP (ref 0–0.5)
EOSINOPHIL NFR BLD AUTO: 1.9 % — SIGNIFICANT CHANGE UP (ref 0–6)
GLUCOSE SERPL-MCNC: 99 MG/DL — SIGNIFICANT CHANGE UP (ref 70–115)
HCT VFR BLD CALC: 38.4 % — SIGNIFICANT CHANGE UP (ref 37–47)
HGB BLD-MCNC: 13.1 G/DL — SIGNIFICANT CHANGE UP (ref 12–16)
LYMPHOCYTES # BLD AUTO: 1.4 K/UL — SIGNIFICANT CHANGE UP (ref 1–4.8)
LYMPHOCYTES # BLD AUTO: 28.9 % — SIGNIFICANT CHANGE UP (ref 20–55)
MAGNESIUM SERPL-MCNC: 2.2 MG/DL — SIGNIFICANT CHANGE UP (ref 1.6–2.6)
MCHC RBC-ENTMCNC: 30.2 PG — SIGNIFICANT CHANGE UP (ref 27–31)
MCHC RBC-ENTMCNC: 34.1 G/DL — SIGNIFICANT CHANGE UP (ref 32–36)
MCV RBC AUTO: 88.5 FL — SIGNIFICANT CHANGE UP (ref 81–99)
MONOCYTES # BLD AUTO: 0.3 K/UL — SIGNIFICANT CHANGE UP (ref 0–0.8)
MONOCYTES NFR BLD AUTO: 6.7 % — SIGNIFICANT CHANGE UP (ref 3–10)
NEUTROPHILS # BLD AUTO: 3 K/UL — SIGNIFICANT CHANGE UP (ref 1.8–8)
NEUTROPHILS NFR BLD AUTO: 62.3 % — SIGNIFICANT CHANGE UP (ref 37–73)
PLATELET # BLD AUTO: 211 K/UL — SIGNIFICANT CHANGE UP (ref 150–400)
POTASSIUM SERPL-MCNC: 4.2 MMOL/L — SIGNIFICANT CHANGE UP (ref 3.5–5.3)
POTASSIUM SERPL-SCNC: 4.2 MMOL/L — SIGNIFICANT CHANGE UP (ref 3.5–5.3)
RBC # BLD: 4.34 M/UL — LOW (ref 4.4–5.2)
RBC # FLD: 11.8 % — SIGNIFICANT CHANGE UP (ref 11–15.6)
SODIUM SERPL-SCNC: 135 MMOL/L — SIGNIFICANT CHANGE UP (ref 135–145)
WBC # BLD: 4.8 K/UL — SIGNIFICANT CHANGE UP (ref 4.8–10.8)
WBC # FLD AUTO: 4.8 K/UL — SIGNIFICANT CHANGE UP (ref 4.8–10.8)

## 2017-08-25 PROCEDURE — 93005 ELECTROCARDIOGRAM TRACING: CPT

## 2017-08-25 PROCEDURE — 86901 BLOOD TYPING SEROLOGIC RH(D): CPT

## 2017-08-25 PROCEDURE — 83735 ASSAY OF MAGNESIUM: CPT

## 2017-08-25 PROCEDURE — 99239 HOSP IP/OBS DSCHRG MGMT >30: CPT

## 2017-08-25 PROCEDURE — 84484 ASSAY OF TROPONIN QUANT: CPT

## 2017-08-25 PROCEDURE — 83880 ASSAY OF NATRIURETIC PEPTIDE: CPT

## 2017-08-25 PROCEDURE — 71045 X-RAY EXAM CHEST 1 VIEW: CPT

## 2017-08-25 PROCEDURE — 99285 EMERGENCY DEPT VISIT HI MDM: CPT | Mod: 25

## 2017-08-25 PROCEDURE — 33249 INSJ/RPLCMT DEFIB W/LEAD(S): CPT

## 2017-08-25 PROCEDURE — 85027 COMPLETE CBC AUTOMATED: CPT

## 2017-08-25 PROCEDURE — 36415 COLL VENOUS BLD VENIPUNCTURE: CPT

## 2017-08-25 PROCEDURE — 80048 BASIC METABOLIC PNL TOTAL CA: CPT

## 2017-08-25 PROCEDURE — 71046 X-RAY EXAM CHEST 2 VIEWS: CPT

## 2017-08-25 PROCEDURE — 83036 HEMOGLOBIN GLYCOSYLATED A1C: CPT

## 2017-08-25 PROCEDURE — C1722: CPT

## 2017-08-25 PROCEDURE — 71020: CPT | Mod: 26

## 2017-08-25 PROCEDURE — 86850 RBC ANTIBODY SCREEN: CPT

## 2017-08-25 PROCEDURE — 93010 ELECTROCARDIOGRAM REPORT: CPT

## 2017-08-25 PROCEDURE — 85610 PROTHROMBIN TIME: CPT

## 2017-08-25 PROCEDURE — 93306 TTE W/DOPPLER COMPLETE: CPT

## 2017-08-25 PROCEDURE — T1013: CPT

## 2017-08-25 PROCEDURE — C1777: CPT

## 2017-08-25 PROCEDURE — C1769: CPT

## 2017-08-25 PROCEDURE — 86900 BLOOD TYPING SEROLOGIC ABO: CPT

## 2017-08-25 PROCEDURE — 81001 URINALYSIS AUTO W/SCOPE: CPT

## 2017-08-25 PROCEDURE — 84443 ASSAY THYROID STIM HORMONE: CPT

## 2017-08-25 PROCEDURE — 80053 COMPREHEN METABOLIC PANEL: CPT

## 2017-08-25 RX ORDER — ACETAMINOPHEN 500 MG
2 TABLET ORAL
Qty: 30 | Refills: 0 | OUTPATIENT
Start: 2017-08-25 | End: 2017-09-04

## 2017-08-25 RX ORDER — ISOSORBIDE MONONITRATE 60 MG/1
1 TABLET, EXTENDED RELEASE ORAL
Qty: 0 | Refills: 0 | COMMUNITY

## 2017-08-25 RX ORDER — AMLODIPINE BESYLATE 2.5 MG/1
2 TABLET ORAL
Qty: 0 | Refills: 0 | COMMUNITY

## 2017-08-25 RX ORDER — METOPROLOL TARTRATE 50 MG
1 TABLET ORAL
Qty: 30 | Refills: 0 | OUTPATIENT
Start: 2017-08-25 | End: 2017-09-24

## 2017-08-25 RX ORDER — FUROSEMIDE 40 MG
1 TABLET ORAL
Qty: 30 | Refills: 0 | OUTPATIENT
Start: 2017-08-25 | End: 2017-09-24

## 2017-08-25 RX ORDER — METOPROLOL TARTRATE 50 MG
1 TABLET ORAL
Qty: 0 | Refills: 0 | COMMUNITY

## 2017-08-25 RX ADMIN — Medication 100 MILLIGRAM(S): at 05:27

## 2017-08-25 RX ADMIN — Medication 50 MILLIGRAM(S): at 05:31

## 2017-08-25 RX ADMIN — SPIRONOLACTONE 25 MILLIGRAM(S): 25 TABLET, FILM COATED ORAL at 05:28

## 2017-08-25 RX ADMIN — LOSARTAN POTASSIUM 25 MILLIGRAM(S): 100 TABLET, FILM COATED ORAL at 05:28

## 2017-08-25 RX ADMIN — Medication 20 MILLIGRAM(S): at 05:27

## 2017-08-25 RX ADMIN — Medication 650 MILLIGRAM(S): at 16:31

## 2017-08-25 NOTE — PROGRESS NOTE ADULT - SUBJECTIVE AND OBJECTIVE BOX
POD#1 s/p single chamber BSCI ICD. c/o site soreness relieved with pain meds.      ECG: NSR 77bpm, prolonged av delay, LVH, poor R wave progression  TELE: NSR infrequent PVC  CXR PA/LAT: pending  BSCI interrogation: VVI 50bpm, R-21.3mv, 656ohms, 0.5v @ 0.4ms. VT monitor 160bpm, VF 200bpm    MEDICATIONS  (STANDING):  losartan 25 milliGRAM(s) Oral daily  spironolactone 25 milliGRAM(s) Oral daily  miconazole 2% Cream 1 Application(s) Topical two times a day  doxycycline hyclate Capsule 100 milliGRAM(s) Oral every 12 hours  furosemide    Tablet 20 milliGRAM(s) Oral daily  metoprolol succinate ER 50 milliGRAM(s) Oral daily  MEDICATIONS  (PRN):  acetaminophen   Tablet. 650 milliGRAM(s) Oral every 6 hours PRN Mild Pain (1 - 3)  acetaminophen   Tablet. 650 milliGRAM(s) Oral every 6 hours PRN Mild Pain (1 - 3)  oxyCODONE    5 mG/acetaminophen 325 mG 1 Tablet(s) Oral every 6 hours PRN Moderate Pain (4 - 6)  ALPRAZolam 0.25 milliGRAM(s) Oral four times a day PRN anxiety or insomnia  ondansetron Injectable 4 milliGRAM(s) IV Push every 6 hours PRN Nausea and/or Vomiting    PAST MEDICAL & SURGICAL HISTORY:  Essential hypertension  Bradycardia  H/O bilateral breast reduction surgery    Vital Signs Last 24 Hrs  T(C): 36.7 (25 Aug 2017 05:22), Max: 37.1 (24 Aug 2017 21:42)  T(F): 98.1 (25 Aug 2017 05:22), Max: 98.8 (24 Aug 2017 21:42)  HR: 74 (25 Aug 2017 05:22) (60 - 74)  BP: 112/70 (25 Aug 2017 05:22) (94/54 - 112/70)  RR: 18 (25 Aug 2017 05:22) (10 - 26)  SpO2: 96% (25 Aug 2017 05:22) (96% - 100%)    Physical Exam:  Constitutional: NAD, sleeping, arousible  Cardiovascular: +S1S2 RRR  LACW: pressure dsg removed +suture, site without hematoma, bleeding, swelling +2radial pulse. Appropriately tender  Pulmonary: CTA b/l, unlabored  GI: soft NTND +BS  Extremities: no pedal edema, +distal pulses b/l  Neuro: non focal, REEVES x4    LABS:                        13.1   4.8   )-----------( 211      ( 25 Aug 2017 05:40 )             38.4     08-25    135  |  97<L>  |  34.0<H>  ----------------------------<  99  4.2   |  22.0  |  0.79    Ca    9.3      25 Aug 2017 05:40  Mg     2.2     08-25      PT/INR - ( 24 Aug 2017 05:33 )   PT: 11.4 sec;   INR: 1.04 ratio         A/P: 46 yo female with NICM EF <20% on optimal meds >3months POD#1 s/p single chamber primary prevention  BSCI ICD (VVI 50bpm) to LACW.    -am REMI,tele,labs and PE wnl  -CXR PA/LAT prior to discharge  -doxycycline 100mg po bid x 5 days  -no lifting left arm over shoulder x 6 weeks, keep site dry x 1 week  -outpt follow up 2 weeks Dr Juárez at [facility] clinic for device check and suture removal. Follow up [facility] CHF clinic 2-4 weeks.  -continue metoprolol, losartan, spironolactone, imdur  -device care, restrictions, follow up and shock plan reviewed with pt via  [facility] on 8/24/17  -s/w case management Aleksandra to speak with pt regarding Visa and returning to [place]  -pt may be discharged after above cxr reviewed  above dw Dr Juárez agrees

## 2018-11-07 NOTE — PROGRESS NOTE ADULT - PROBLEM SELECTOR PLAN 4
2123 - Spoke to Dr. Jaime about pt's MAP being 57-60 mmHg at this time despite constantly up-titrating levophed. Stated the cuff pressure shows 44/19. States to give pt a saline bolus of 500 cc. States may rinse back blood in pt continues to deteriorate. States to try to keep MAP above 55 mmHg    2128 - Dr. Jaime at the bedside.    11/7/18 0012 - Spoke to Dr. Jaime about pt's blood culture result resembling strep. States not to initiate precautions at this time, and that he will change the precautions based on when the results are finalized. Updated physician on trending MAP and needing to up-titrate epinephrine and norepinephrine. States to give the pt another 500 cc bolus.      0200- spoke with eICU MD, Dr. Ordonez, regarding suggestions for further treatment per Dr. Jaime; asked questions regarding contact precautions and insulin infusion; reported full head to toe assessment including, GCS, pupils and reflexes, lab values, intake,  inability to tolerate CRRT ordered rates, etc.     0240 - Spoke to Dr Ordonez about pt's OG output since surgery being about 800 cc. Appears as black in color, and presents thin. Dr. Ordonez also aware of pt's temperature and states not to warm pt using Balbir hugger    0340- eICU notified of decreasing heart rate and blood pressure; unable to get ahold of Dr. Jaime, left message for MD; Dr. Ordonez aware; see new orders     [facility]T called     0438 - Dr. Ordonez speaking with family at this time, and giving an update on pt's status and reviewing care throughout the night with pt's family.    0454 - Dr. Jaime at the bedside speaking with pt's family at this time.     0512 - Pt hooked to defibrillator pads at this time. Code cart at the bedside.     0520 - CRRT keeps alarming stating clot within system. Rinse-back performed. Dr. Jaime aware. No new orders.   
DVT PPX
DVT PPX

## 2021-11-02 NOTE — PROGRESS NOTE ADULT - ASSESSMENT
47 year old female with recently diagnosed cardiomyopathy- NICM;  previously seen at [facility] 5/19/2017 at that time LVEF 15-20% low normal Rv systolic function trace MR, x ray revealed a pleural effusion s/p thoracentesis and load reduction therapy and a Cardiac CT 6/21/2017 which revealed nonobstructive CAD. The patient underwent a repeat 2-D echocardiogram on 7/14/2017  which revealed persistent/worsening LV systolic dysfunction LVEF < 15%. Currently pt presents with complaints of worsening JOHNSON x 2 weeks. Pt admitted with acute on chronic systolic HF exacerbation, on IV diuresis and optimization with medical therapy. EP consulted and repeat TTE pending if persistent LV dysfunction <35% pt for ICD placement for primary prophylaxis.
47 year old female with recently diagnosed cardiomyopathy- NICM;  previously seen at [facility] 5/19/2017 at that time LVEF 15-20% low normal Rv systolic function trace MR, x ray revealed a pleural effusion s/p thoracentesis and load reduction therapy and a Cardiac CT 6/21/2017 which revealed nonobstructive CAD. The patient underwent a repeat 2-D echocardiogram on 7/14/2017  which revealed persistent/worsening LV systolic dysfunction LVEF < 15%. Currently pt presented with complaints of worsening JOHNSON x 2 weeks. Pt admitted with acute on chronic systolic HF exacerbation, on IV diuresis which was switched to po and optimization with medical therapy. EP consulted and repeat TTE showed persistent LV dysfunction <20%. S/p ICD placement on 8/24/17. If patient remains medically stable to be discharged tomorrow after cleared by EP.
47yr old female with h/o NICM  presents with palpitations and generalized fatigue and sob with exertion x 2 weeks.  patient previously seen at [facility] here history is noted an initial presentation of PNA  5/19//2017 at [facility] at that time LVEF 20- 15% low normal Rv systolic function trace MR, cxray revealed a  had pleural effusion s/p  thoracentesis the patient was placed on load reduction therapy  and a Cardiac CT 6/21/2017 revealed nonobstructive CAD. The patient underwent a repeat 2-D echocardiogram on 7/14/2017  which revealed persistent/worsening LV systolic dysfunction LVEF < 15%.  patient presents to day with complaints of worsening JOHNSON x 2 weeks . She denies chest pain syncope pre-syncope orthopnea or LE edema.    A/p  Chronic NICM , NYHA class II HFrEF, (-) cardiac CTA, HTN, QRs 98ms, Acute on chronic HF exacerbation: repeat echo Pending today    HFrEF: Continue load reduction therapy , add lasix 20 mg IV Q12 follow I/os, increase lasix as indicated , daily weights, strict I/os; check TSH  HfrEF: repeat TTEif LVEF <35% will likely benefit from ICD fro primary prevention, once clinically improved  HTN: BP stable near goal
47yr old female with recently diagnosed cardiomyopathy with EF 15% in june 2017 presents with c/o feeling sob on exertion, pedal swelling intermittently, palpitations and chest tightness. These symptoms have been intermittent, haven gotten worse over last week.   She was admitted in 03/2017 with Sob - treated for pneumonia, had pleural effusion, which was drained. Found to have cardiomyopathy with EF 30%, this was determined to be non-ischemic CMP. Her recent EF in june dropped to 15% inspite of being of CHF meds. She is now being admitted for further care.     1. NICM - telemetry  ECHO today - prior to ICD placement per cardio  ct CHF meds - diurese today  TSH, A1C - normal     2. DVT px     3.Tinea on chest wall- improving ct miconazole ointment
English

## 2024-06-10 NOTE — PATIENT PROFILE ADULT. - NS PRO PT REFERRAL QUES 2 YN
RM:________     PCP: Vanesa Bernard PA    : 1963  AGE: 60 y.o.  EST PATIENT     REASON FOR VISIT/  CC:        BP Readings from Last 3 Encounters:   24 138/78   24 146/76   24 162/88      Wt Readings from Last 3 Encounters:   24 79.4 kg (175 lb)   24 78.5 kg (173 lb)   24 78.1 kg (172 lb 1.6 oz)        WT: ____________ BP: __________L __________R HR______    CHEST PAIN: _____________    SOA: _____________PALPS: _______________     LIGHTHEADED: ___________FATIGUE: ________________ EDEMA __________    ALLERGIES:Hydroxyzine, Levothyroxine, and Trelegy ellipta [fluticasone-umeclidin-vilant] SMOKING HISTORY:  Social History     Tobacco Use    Smoking status: Former     Current packs/day: 0.00     Average packs/day: 1 pack/day for 41.3 years (41.3 ttl pk-yrs)     Types: Cigarettes     Start date: 1974     Quit date: 2015     Years since quittin.0    Smokeless tobacco: Never    Tobacco comments:     Began smoking at age 9.  Smoked 1.5 ppd for 6 years and 1 ppd for 36 years for a 45 pack year history.  She says that she quit 3 years ago even though she reports still smoking 1 or 2 cigarettes per week.   Vaping Use    Vaping status: Former   Substance Use Topics    Alcohol use: Yes     Alcohol/week: 2.0 standard drinks of alcohol     Types: 2 Cans of beer per week     Comment: Drinks 1 or 2 beers    Drug use: No     CAFFEINE USE_________________  ALCOHOL ______________________     no